# Patient Record
Sex: FEMALE | Race: WHITE | NOT HISPANIC OR LATINO | Employment: PART TIME | URBAN - METROPOLITAN AREA
[De-identification: names, ages, dates, MRNs, and addresses within clinical notes are randomized per-mention and may not be internally consistent; named-entity substitution may affect disease eponyms.]

---

## 2020-01-27 NOTE — PROGRESS NOTES
"Subjective     Diya Moreno is a 29 year old female who presents to clinic today for the following health issues:    History of Present Illness        Hypothyroidism:     Since last visit, patient describes the following symptoms::  Dry skin, Fatigue and Hair loss    Migraines:   Since the patient's last clinic visit, headaches are: no change  The patient is getting headaches:  Very often  She is not able to do normal daily activities when she has a migraine.  The patient is taking the following rescue/relief medications:  Ibuprofen (Advil, Motrin)   Patient states \"The relief is inconsistent\" from the rescue/relief medications.   The patient is taking the following medications to prevent migraines:  No medications to prevent migraines  In the past 4 weeks, the patient has gone to an Urgent Care or Emergency Room 0 times times due to headaches.    She eats 2-3 servings of fruits and vegetables daily.She consumes 0 sweetened beverage(s) daily.She exercises with enough effort to increase her heart rate 20 to 29 minutes per day.  She exercises with enough effort to increase her heart rate 3 or less days per week.   She is taking medications regularly.       Patient is here today to discuss possible thyroid problems. She was on thyroid medication about 10ish years ago and has never had it checked since. Last year, about this time, she went to [facility] and she was having foot problems and they think she had Raynaud's disease, her feet turn purple first and start tingling and then red and get really hot. She also wants to talk about migraines, she said she is on day 7.     Headache  Onset: 7 days, today is a little mild. Last Tuesday it was bad, Wednesday was up there. Thursday-Saturday was a little better. Monday she stayed home from work it felt like someone was squeezing her head.     Description:   Location: unilateral in the right frontal area, unilateral in the right temporal area   Character: squeezing pain " yesterday, feels like a lot of pressure today.   Frequency:  Constant, up and down for pain level.   Duration:  All day long.     Intensity: 2/10 right now.     Progression of Symptoms:  constant    Accompanying Signs & Symptoms:  Stiff neck: no  Neck or upper back pain: no  Fever: no, but she was having chills and sweats.  Sinus pressure: no  Nausea or vomiting: some nausea, last week on Tuesday.   Dizziness: YES-when it is really bad, worst   Numbness: YES-when it is really bad  Weakness: YES-when it is really bad, worst.  Visual changes: YES- blurry vision periodically. When she looks into a light, she sees spots.     History:   Head trauma: no  Family history of migraines: not that she is aware of  Previous tests for headaches: no, she was here once a few years ago for headaches.   Neurologist evaluations: no  Able to do daily activities: YES- for the most part, she does work on a computer so sometimes it is hard.   Wake with a headaches: YES- has been continual headache, yesterday morning for sure.   Do headaches wake you up: no  Daily pain medication use: not usually, ibuprofen though for the last week.   Work/school stressors/changes: no    Precipitating factors:   Does light make it worse: YES- sitting at computer and looking into the light.   Does sound make it worse: YES- when it is bad, she like to sit in a dark/quite room.     Alleviating factors:  Does sleep help: YES- most of the time, takes it down a couple of notches.     Therapies Tried and outcome: Ibuprofen (Advil, Motrin) and Imitrex. She has in the past tried tension headache medication many years ago.    - has had headaches maybe for the last 6 years.  However lately it has been very well controlled.  She had a single one day headache a few weeks ago but prior to that it had been at least a few months since her last one.   - Her headaches are never consistently in the same area.  Right now it feels like it is all over.    - Last week one day  and yesterday her headache was severe.  Otherwise the other days have been more tolerable but it is still present.   - No known family history of migraines.   - No vision changes, she saw ophthalmology a year ago and no prescription changes at that time.   - She has a history of hypothyroidism.  Diagnosed in 2012.  She was placed on thyroid medicine but never took the full bottle and never followed up.  Hasn't been on medications since then. She has no known family history of hypothyroidism or thyroid disease.   - She has occasional constipation.  Has issues with hair thinning and dry skin.  She denies anxiety/depression.  No palpitations.  Occasional dizziness with the headaches.     Patient Active Problem List   Diagnosis     CARDIOVASCULAR SCREENING; LDL GOAL LESS THAN 160     Bony exostosis     Intermittent asthma     Migraine headache without aura     History of hypothyroidism     Hypothyroidism     Past Surgical History:   Procedure Laterality Date     bunionectomy, left  8/2008    outside of [place]     EXCISE EXOSTOSIS FOOT  2/4/2014    Procedure: EXCISE EXOSTOSIS FOOT;  Exostectomy left first metatarsal;  Surgeon: Flako Colin DPM;  Location:  OR       Social History     Tobacco Use     Smoking status: Never Smoker     Smokeless tobacco: Never Used   Substance Use Topics     Alcohol use: Yes     Comment: occasionally      Family History   Problem Relation Age of Onset     Cerebrovascular Disease Mother      Heart Disease Mother         PFO repair     Cancer Maternal Grandmother         kidney     Diabetes Maternal Uncle      No Known Problems Father          Current Outpatient Medications   Medication Sig Dispense Refill     albuterol (PROAIR HFA, PROVENTIL HFA, VENTOLIN HFA) 108 (90 BASE) MCG/ACT inhaler Inhale 2 puffs into the lungs every 6 hours as needed for shortness of breath / dyspnea or wheezing 1 Inhaler 1     IBUPROFEN PO Take by mouth as needed for moderate pain       Allergies  "  Allergen Reactions     Benzoin      BP Readings from Last 3 Encounters:   01/28/20 106/80   12/29/14 128/72   03/20/14 110/60    Wt Readings from Last 3 Encounters:   01/28/20 68.5 kg (151 lb)   12/29/14 66.7 kg (147 lb)   03/20/14 64 kg (141 lb 1.6 oz)                    Reviewed and updated as needed this visit by Provider  Tobacco  Allergies  Meds  Problems  Med Hx  Surg Hx  Fam Hx         Review of Systems   ROS COMP: Constitutional, HEENT, cardiovascular, pulmonary, GI, , musculoskeletal, neuro, skin, endocrine and psych systems are negative, except as otherwise noted.      Objective    /80   Pulse 92   Temp 98.5  F (36.9  C) (Temporal)   Resp 16   Ht 1.7 m (5' 6.93\")   Wt 68.5 kg (151 lb)   LMP 01/22/2020 (Exact Date)   SpO2 97%   BMI 23.70 kg/m    Body mass index is 23.7 kg/m .  Physical Exam   GENERAL: healthy, alert and no distress  EYES: Eyes grossly normal to inspection, PERRL and conjunctivae and sclerae normal  HENT: ear canals and TM's normal, nose and mouth without ulcers or lesions  NECK: no adenopathy, no asymmetry, masses, or scars and thyroid right side lobe palpable without specific masses, left lobe normal.   RESP: lungs clear to auscultation - no rales, rhonchi or wheezes  CV: regular rate and rhythm, normal S1 S2, no S3 or S4, no murmur, click or rub, no peripheral edema and peripheral pulses strong  MS: no gross musculoskeletal defects noted, no edema, 5/5 upper extremity strength bilaterally.   SKIN: no suspicious lesions or rashes  NEURO: Normal strength and tone, sensory exam grossly normal, mentation intact, speech normal, cranial nerves 2-12 intact, DTR's normal and symmetric 2+ patellar, 1+ biceps, gait normal, Romberg normal and rapid alternating movements normal  PSYCH: mentation appears normal, affect normal/bright    Diagnostic Test Results:  Labs reviewed in Epic  Results for orders placed or performed in visit on 01/28/20 (from the past 24 hour(s))   TSH " with free T4 reflex   Result Value Ref Range    TSH 2.05 0.40 - 4.00 mU/L           Assessment & Plan       ICD-10-CM    1. Encounter to establish care Z76.89    2. Hypothyroidism, unspecified type E03.9 TSH with free T4 reflex     US Thyroid   3. Palpable thyroid E07.89 US Thyroid   4. Migraine without aura and without status migrainosus, not intractable G43.009       Updated Histories.     1. Thyroid:  - Rechecking TSH today.   - Her thyroid on right side was palpable without specific masses.   - Recommended ultrasound of the thyroid to evaluate.   - If TSH is elevated recommend starting medication even if TSH is higher end of normal given her symptoms.  Then recheck in 4-6 weeks for labs.   - Thyroid is within normal limits. Do not recommend replacement as it is 2, do recommend repeat labs annually.     2. Migraines:  - Right now it is mild.   - Her neurologic examination was benign.   - if thyroid is normal and she continues to have escalating migraines or persistent migraines would recommend MRI for evaluation. Then from there consider abortive versus prophylactic medication.   - Recommended headache diary in the meantime.     She is overdue for physical and pap smear (has never had one).  Will have her schedule follow-up regarding above issues in the next 2-4 weeks depending on labs and then update her physical at the same time.     Return in about 1 month (around 2/28/2020) for Physical Exam.    Options for treatment and follow-up care were reviewed with the patient and/or guardian. Patient and/or guardian engaged in the decision making process and verbalized understanding of the options discussed and agreed with the final plan.    Willard Arriola PA-C  [facility]

## 2020-01-28 ENCOUNTER — OFFICE VISIT (OUTPATIENT)
Dept: FAMILY MEDICINE | Facility: OTHER | Age: 30
End: 2020-01-28
Payer: COMMERCIAL

## 2020-01-28 VITALS
WEIGHT: 151 LBS | OXYGEN SATURATION: 97 % | SYSTOLIC BLOOD PRESSURE: 106 MMHG | BODY MASS INDEX: 23.7 KG/M2 | RESPIRATION RATE: 16 BRPM | HEIGHT: 67 IN | DIASTOLIC BLOOD PRESSURE: 80 MMHG | HEART RATE: 92 BPM | TEMPERATURE: 98.5 F

## 2020-01-28 DIAGNOSIS — E04.9 PALPABLE THYROID: ICD-10-CM

## 2020-01-28 DIAGNOSIS — G43.009 MIGRAINE WITHOUT AURA AND WITHOUT STATUS MIGRAINOSUS, NOT INTRACTABLE: ICD-10-CM

## 2020-01-28 DIAGNOSIS — E03.9 HYPOTHYROIDISM, UNSPECIFIED TYPE: ICD-10-CM

## 2020-01-28 DIAGNOSIS — Z76.89 ENCOUNTER TO ESTABLISH CARE: Primary | ICD-10-CM

## 2020-01-28 LAB — TSH SERPL DL<=0.005 MIU/L-ACNC: 2.05 MU/L (ref 0.4–4)

## 2020-01-28 PROCEDURE — 36415 COLL VENOUS BLD VENIPUNCTURE: CPT | Performed by: PHYSICIAN ASSISTANT

## 2020-01-28 PROCEDURE — 84443 ASSAY THYROID STIM HORMONE: CPT | Performed by: PHYSICIAN ASSISTANT

## 2020-01-28 PROCEDURE — 99204 OFFICE O/P NEW MOD 45 MIN: CPT | Performed by: PHYSICIAN ASSISTANT

## 2020-01-28 ASSESSMENT — ASTHMA QUESTIONNAIRES
ACT_TOTALSCORE: 25
QUESTION_2 LAST FOUR WEEKS HOW OFTEN HAVE YOU HAD SHORTNESS OF BREATH: NOT AT ALL
QUESTION_3 LAST FOUR WEEKS HOW OFTEN DID YOUR ASTHMA SYMPTOMS (WHEEZING, COUGHING, SHORTNESS OF BREATH, CHEST TIGHTNESS OR PAIN) WAKE YOU UP AT NIGHT OR EARLIER THAN USUAL IN THE MORNING: NOT AT ALL
QUESTION_1 LAST FOUR WEEKS HOW MUCH OF THE TIME DID YOUR ASTHMA KEEP YOU FROM GETTING AS MUCH DONE AT WORK, SCHOOL OR AT HOME: NONE OF THE TIME
QUESTION_4 LAST FOUR WEEKS HOW OFTEN HAVE YOU USED YOUR RESCUE INHALER OR NEBULIZER MEDICATION (SUCH AS ALBUTEROL): NOT AT ALL
QUESTION_5 LAST FOUR WEEKS HOW WOULD YOU RATE YOUR ASTHMA CONTROL: COMPLETELY CONTROLLED

## 2020-01-28 ASSESSMENT — MIFFLIN-ST. JEOR: SCORE: 1446.43

## 2020-01-28 NOTE — PATIENT INSTRUCTIONS
We will call with lab and ultrasound results and plan for follow-up.     Let me know in the meantime or come back in if the headaches are becoming more severe or new symptoms develop with them.  In the meantime take a headache diary.

## 2020-01-29 ENCOUNTER — ANCILLARY PROCEDURE (OUTPATIENT)
Dept: ULTRASOUND IMAGING | Facility: CLINIC | Age: 30
End: 2020-01-29
Attending: PHYSICIAN ASSISTANT
Payer: COMMERCIAL

## 2020-01-29 DIAGNOSIS — E03.9 HYPOTHYROIDISM, UNSPECIFIED TYPE: ICD-10-CM

## 2020-01-29 DIAGNOSIS — E04.9 PALPABLE THYROID: ICD-10-CM

## 2020-01-29 PROCEDURE — 76536 US EXAM OF HEAD AND NECK: CPT

## 2020-01-29 ASSESSMENT — ASTHMA QUESTIONNAIRES: ACT_TOTALSCORE: 25

## 2020-01-31 ENCOUNTER — TELEPHONE (OUTPATIENT)
Dept: FAMILY MEDICINE | Facility: OTHER | Age: 30
End: 2020-01-31

## 2020-01-31 DIAGNOSIS — E06.9 THYROIDITIS: Primary | ICD-10-CM

## 2020-01-31 NOTE — TELEPHONE ENCOUNTER
Spoke with patient.     In 2012 she has Thyroperoxidase Antibody drawn and was elevated. This is not surprising as she had hypothyroidism in 2012.  She didn't have any signs of hyperthyroidism at the time and her T3 and T4 were repeated as well as TSH in 2012 and they were within normal limits.   She will likely need screens.  Possibly autoimmune thyroiditis.  No signs of masses to suggest cancer at this time.     Recommended obtaining T3 and T4 and if WNL can continue to screen annually or any concerns or changes.     She will follow-up to discuss migraine/headache management if they continue.     Willard Arriola PA-C

## 2020-02-03 DIAGNOSIS — E06.9 THYROIDITIS: ICD-10-CM

## 2020-02-03 PROCEDURE — 36415 COLL VENOUS BLD VENIPUNCTURE: CPT | Performed by: PHYSICIAN ASSISTANT

## 2020-02-03 PROCEDURE — 84480 ASSAY TRIIODOTHYRONINE (T3): CPT | Performed by: PHYSICIAN ASSISTANT

## 2020-02-03 PROCEDURE — 84481 FREE ASSAY (FT-3): CPT | Performed by: PHYSICIAN ASSISTANT

## 2020-02-03 PROCEDURE — 84439 ASSAY OF FREE THYROXINE: CPT | Performed by: PHYSICIAN ASSISTANT

## 2020-02-04 LAB
T3 SERPL-MCNC: 132 NG/DL (ref 60–181)
T3FREE SERPL-MCNC: 3.1 PG/ML (ref 2.3–4.2)
T4 FREE SERPL-MCNC: 0.92 NG/DL (ref 0.76–1.46)

## 2020-02-11 ENCOUNTER — TELEPHONE (OUTPATIENT)
Dept: FAMILY MEDICINE | Facility: OTHER | Age: 30
End: 2020-02-11

## 2020-02-11 NOTE — PATIENT INSTRUCTIONS
Migraine/Headache:  - Start Nortriptyline  - Take 1 tablet at bedtime.   - Monitor for side effects.   - If tolerating, after a week can increase to 2 tablets if you haven't see any benefits in sleep or headaches.     Hemorrhoid:  - Control the constipation (Docusate sodium 100 mg daily or Miralax 17 grams daily.)  - Probiotics  - Over the counter hemorrhoid cream  - Sitz baths.       Preventive Health Recommendations  Female Ages 26 - 39  Yearly exam:   See your health care provider every year in order to    Review health changes.     Discuss preventive care.      Review your medicines if you your doctor has prescribed any.    Until age 30: Get a Pap test every three years (more often if you have had an abnormal result).    After age 30: Talk to your doctor about whether you should have a Pap test every 3 years or have a Pap test with HPV screening every 5 years.   You do not need a Pap test if your uterus was removed (hysterectomy) and you have not had cancer.  You should be tested each year for STDs (sexually transmitted diseases), if you're at risk.   Talk to your provider about how often to have your cholesterol checked.  If you are at risk for diabetes, you should have a diabetes test (fasting glucose).  Shots: Get a flu shot each year. Get a tetanus shot every 10 years.   Nutrition:     Eat at least 5 servings of fruits and vegetables each day.    Eat whole-grain bread, whole-wheat pasta and brown rice instead of white grains and rice.    Get adequate Calcium and Vitamin D.     Lifestyle    Exercise at least 150 minutes a week (30 minutes a day, 5 days of the week). This will help you control your weight and prevent disease.    Limit alcohol to one drink per day.    No smoking.     Wear sunscreen to prevent skin cancer.    See your dentist every six months for an exam and cleaning.

## 2020-02-14 ENCOUNTER — OFFICE VISIT (OUTPATIENT)
Dept: FAMILY MEDICINE | Facility: OTHER | Age: 30
End: 2020-02-14
Payer: COMMERCIAL

## 2020-02-14 VITALS
HEART RATE: 100 BPM | BODY MASS INDEX: 23.86 KG/M2 | HEIGHT: 67 IN | DIASTOLIC BLOOD PRESSURE: 80 MMHG | OXYGEN SATURATION: 99 % | TEMPERATURE: 98.6 F | SYSTOLIC BLOOD PRESSURE: 110 MMHG | WEIGHT: 152 LBS | RESPIRATION RATE: 14 BRPM

## 2020-02-14 DIAGNOSIS — Z00.00 ROUTINE GENERAL MEDICAL EXAMINATION AT A HEALTH CARE FACILITY: Primary | ICD-10-CM

## 2020-02-14 DIAGNOSIS — G43.009 MIGRAINE WITHOUT AURA AND WITHOUT STATUS MIGRAINOSUS, NOT INTRACTABLE: ICD-10-CM

## 2020-02-14 DIAGNOSIS — Z23 NEED FOR DIPHTHERIA-TETANUS-PERTUSSIS (TDAP) VACCINE: ICD-10-CM

## 2020-02-14 DIAGNOSIS — Z12.4 SCREENING FOR MALIGNANT NEOPLASM OF CERVIX: ICD-10-CM

## 2020-02-14 DIAGNOSIS — J45.20 MILD INTERMITTENT ASTHMA WITHOUT COMPLICATION: ICD-10-CM

## 2020-02-14 PROCEDURE — G0145 SCR C/V CYTO,THINLAYER,RESCR: HCPCS | Performed by: PHYSICIAN ASSISTANT

## 2020-02-14 PROCEDURE — 90471 IMMUNIZATION ADMIN: CPT | Performed by: PHYSICIAN ASSISTANT

## 2020-02-14 PROCEDURE — 90715 TDAP VACCINE 7 YRS/> IM: CPT | Performed by: PHYSICIAN ASSISTANT

## 2020-02-14 PROCEDURE — 99395 PREV VISIT EST AGE 18-39: CPT | Mod: 25 | Performed by: PHYSICIAN ASSISTANT

## 2020-02-14 PROCEDURE — 99213 OFFICE O/P EST LOW 20 MIN: CPT | Mod: 25 | Performed by: PHYSICIAN ASSISTANT

## 2020-02-14 RX ORDER — NORTRIPTYLINE HCL 10 MG
10 CAPSULE ORAL AT BEDTIME
Qty: 90 CAPSULE | Refills: 1 | Status: SHIPPED | OUTPATIENT
Start: 2020-02-14 | End: 2021-03-01

## 2020-02-14 ASSESSMENT — ENCOUNTER SYMPTOMS
BACK PAIN: 0
HEADACHES: 1
COUGH: 0
CHILLS: 0
HEMATURIA: 0
BREAST MASS: 0
DYSURIA: 0
SHORTNESS OF BREATH: 0
NERVOUS/ANXIOUS: 0
FREQUENCY: 0
CONSTIPATION: 0
DIARRHEA: 0
ARTHRALGIAS: 0
FEVER: 0
EYE PAIN: 0
DIZZINESS: 0
HEMATOCHEZIA: 0
ABDOMINAL PAIN: 0

## 2020-02-14 ASSESSMENT — MIFFLIN-ST. JEOR: SCORE: 1447.84

## 2020-02-14 NOTE — LETTER
My Asthma Action Plan    Name: Diya Lombardo   YOB: 1990  Date: 2/14/2020   My doctor: Willard Arriola PA-C   My clinic: [facility]        My Rescue Medicine:   Albuterol inhaler (Proair/Ventolin/Proventil HFA)  2-4 puffs EVERY 4 HOURS as needed. Use a spacer if recommended by your provider.   My Asthma Severity:   Intermittent / Exercise Induced  Know your asthma triggers: exercise or sports             GREEN ZONE   Good Control    I feel good    No cough or wheeze    Can work, sleep and play without asthma symptoms       Take your asthma control medicine every day.     1. If exercise triggers your asthma, take your rescue medication    15 minutes before exercise or sports, and    During exercise if you have asthma symptoms  2. Spacer to use with inhaler: If you have a spacer, make sure to use it with your inhaler             YELLOW ZONE Getting Worse  I have ANY of these:    I do not feel good    Cough or wheeze    Chest feels tight    Wake up at night   1. Keep taking your Green Zone medications  2. Start taking your rescue medicine:    every 20 minutes for up to 1 hour. Then every 4 hours for 24-48 hours.  3. If you stay in the Yellow Zone for more than 12-24 hours, contact your doctor.  4. If you do not return to the Green Zone in 12-24 hours or you get worse, start taking your oral steroid medicine if prescribed by your provider.           RED ZONE Medical Alert - Get Help  I have ANY of these:    I feel awful    Medicine is not helping    Breathing getting harder    Trouble walking or talking    Nose opens wide to breathe       1. Take your rescue medicine NOW  2. If your provider has prescribed an oral steroid medicine, start taking it NOW  3. Call your doctor NOW  4. If you are still in the Red Zone after 20 minutes and you have not reached your doctor:    Take your rescue medicine again and    Call 911 or go to the emergency room right away    See your regular doctor within  2 weeks of an Emergency Room or Urgent Care visit for follow-up treatment.          Annual Reminders:  Meet with Asthma Educator,  Flu Shot in the Fall, consider Pneumonia Vaccination for patients with asthma (aged 19 and older).    Pharmacy: [facility] [address] [place] [id number] [facility]    Electronically signed by Willard Arriola PA-C   Date: 02/14/20                    Asthma Triggers  How To Control Things That Make Your Asthma Worse    Triggers are things that make your asthma worse.  Look at the list below to help you find your triggers and   what you can do about them. You can help prevent asthma flare-ups by staying away from your triggers.      Trigger                                                          What you can do   Cigarette Smoke  Tobacco smoke can make asthma worse. Do not allow smoking in your home, car or around you.  Be sure no one smokes at a child s day care or school.  If you smoke, ask your health care provider for ways to help you quit.  Ask family members to quit too.  Ask your health care provider for a referral to Quit Plan to help you quit smoking, or call 4-097-917-PLAN.     Colds, Flu, Bronchitis  These are common triggers of asthma. Wash your hands often.  Don t touch your eyes, nose or mouth.  Get a flu shot every year.     Dust Mites  These are tiny bugs that live in cloth or carpet. They are too small to see. Wash sheets and blankets in hot water every week.   Encase pillows and mattress in dust mite proof covers.  Avoid having carpet if you can. If you have carpet, vacuum weekly.   Use a dust mask and HEPA vacuum.   Pollen and Outdoor Mold  Some people are allergic to trees, grass, or weed pollen, or molds. Try to keep your windows closed.  Limit time out doors when pollen count is high.   Ask you health care provider about taking medicine during allergy season.     Animal Dander  Some people are allergic to skin flakes, urine or saliva from pets with fur or  feathers. Keep pets with fur or feathers out of your home.    If you can t keep the pet outdoors, then keep the pet out of your bedroom.  Keep the bedroom door closed.  Keep pets off cloth furniture and away from stuffed toys.     Mice, Rats, and Cockroaches  Some people are allergic to the waste from these pests.   Cover food and garbage.  Clean up spills and food crumbs.  Store grease in the refrigerator.   Keep food out of the bedroom.   Indoor Mold  This can be a trigger if your home has high moisture. Fix leaking faucets, pipes, or other sources of water.   Clean moldy surfaces.  Dehumidify basement if it is damp and smelly.   Smoke, Strong Odors, and Sprays  These can reduce air quality. Stay away from strong odors and sprays, such as perfume, powder, hair spray, paints, smoke incense, paint, cleaning products, candles and new carpet.   Exercise or Sports  Some people with asthma have this trigger. Be active!  Ask your doctor about taking medicine before sports or exercise to prevent symptoms.    Warm up for 5-10 minutes before and after sports or exercise.     Other Triggers of Asthma  Cold air:  Cover your nose and mouth with a scarf.  Sometimes laughing or crying can be a trigger.  Some medicines and food can trigger asthma.

## 2020-02-14 NOTE — NURSING NOTE
Prior to immunization administration, verified patients identity using patient s name and date of birth. Please see Immunization Activity for additional information.     Screening Questionnaire for Adult Immunization    Are you sick today?   No   Do you have allergies to medications, food, a vaccine component or latex?   No   Have you ever had a serious reaction after receiving a vaccination?   No   Do you have a long-term health problem with heart, lung, kidney, or metabolic disease (e.g., diabetes), asthma, a blood disorder, no spleen, complement component deficiency, a cochlear implant, or a spinal fluid leak?  Are you on long-term aspirin therapy?   Yes   Do you have cancer, leukemia, HIV/AIDS, or any other immune system problem?   No   Do you have a parent, brother, or sister with an immune system problem?   No   In the past 3 months, have you taken medications that affect  your immune system, such as prednisone, other steroids, or anticancer drugs; drugs for the treatment of rheumatoid arthritis, Crohn s disease, or psoriasis; or have you had radiation treatments?   No   Have you had a seizure, or a brain or other nervous system problem?   No   During the past year, have you received a transfusion of blood or blood    products, or been given immune (gamma) globulin or antiviral drug?   No   For women: Are you pregnant or is there a chance you could become       pregnant during the next month?   No   Have you received any vaccinations in the past 4 weeks?   No     Immunization questionnaire was positive for at least one answer.  Notified ES.        Per orders of ES, injection of Tdap given by Kierra Terrell MA. Patient instructed to remain in clinic for 15 minutes afterwards, and to report any adverse reaction to me immediately.       Screening performed by Kierra Terrell MA on 2/14/2020 at 8:45 AM.

## 2020-02-14 NOTE — PROGRESS NOTES
SUBJECTIVE:   CC: Diya Lombardo is an 29 year old woman who presents for preventive health visit.     Healthy Habits:     Getting at least 3 servings of Calcium per day:  Yes    Bi-annual eye exam:  Yes    Dental care twice a year:  NO    Sleep apnea or symptoms of sleep apnea:  Daytime drowsiness    Diet:  Regular (no restrictions)    Frequency of exercise:  2-3 days/week    Duration of exercise:  15-30 minutes    Taking medications regularly:  Yes    Medication side effects:  None    PHQ-2 Total Score: 0    Additional concerns today:  No    Headache Recheck   Onset: a month     Description:   Location: unilateral in the right frontal area, unilateral in the right temporal area. The last couple of days have been back of head near her neck.    Character: light throbbing pain.   Frequency:  Everyday but maybe 2-3 days.   Duration:  Most of the day, but doesn't feel like it has completely gone away.     Intensity: mild, have gotten better since last visit.     Progression of Symptoms:  improving    Accompanying Signs & Symptoms:  Stiff neck: no  Neck or upper back pain: YES- and shoulder areas.   Fever: no  Sinus pressure: no  Nausea or vomiting: no  Dizziness: no  Numbness: no  Weakness: no  Visual changes: no    History:   Head trauma: no  Family history of migraines: no  Previous tests for headaches: YES- Ultrasound but that was of thyroid   Neurologist evaluations: no  Able to do daily activities: YES, sometimes but looking at computer and being a lit room is annoying.   Wake with a headaches: YES  Do headaches wake you up: no  Daily pain medication use: YES- ibuprofen   Work/school stressors/changes: no    Precipitating factors:   Does light make it worse: YES  Does sound make it worse: YES- when its bad.     Alleviating factors:  Does sleep help: YES    Therapies Tried and outcome: Ibuprofen (Advil, Motrin)    - Has only had one severe headache since she was last seen.   - Has had fatigue.  Notes she moves  around a lot at night.        Today's PHQ-2 Score:   PHQ-2 ( 1999 Pfizer) 2/14/2020   Q1: Little interest or pleasure in doing things 0   Q2: Feeling down, depressed or hopeless 0   PHQ-2 Score 0   Q1: Little interest or pleasure in doing things Not at all   Q2: Feeling down, depressed or hopeless Not at all   PHQ-2 Score 0     Abuse: Current or Past(Physical, Sexual or Emotional)- No  Do you feel safe in your environment? Yes    Social History     Tobacco Use     Smoking status: Never Smoker     Smokeless tobacco: Never Used   Substance Use Topics     Alcohol use: Yes     Comment: occasionally      Alcohol Use 2/14/2020   Prescreen: >3 drinks/day or >7 drinks/week? No     Reviewed orders with patient.  Reviewed health maintenance and updated orders accordingly - Yes  BP Readings from Last 3 Encounters:   02/14/20 110/80   01/28/20 106/80   12/29/14 128/72    Wt Readings from Last 3 Encounters:   02/14/20 68.9 kg (152 lb)   01/28/20 68.5 kg (151 lb)   12/29/14 66.7 kg (147 lb)                  Patient Active Problem List   Diagnosis     CARDIOVASCULAR SCREENING; LDL GOAL LESS THAN 160     Bony exostosis     Intermittent asthma     Migraine headache without aura     History of hypothyroidism     Hypothyroidism     Past Surgical History:   Procedure Laterality Date     bunionectomy, left  8/2008    outside of [place]     EXCISE EXOSTOSIS FOOT  2/4/2014    Procedure: EXCISE EXOSTOSIS FOOT;  Exostectomy left first metatarsal;  Surgeon: Flako Colin DPM;  Location:  OR       Social History     Tobacco Use     Smoking status: Never Smoker     Smokeless tobacco: Never Used   Substance Use Topics     Alcohol use: Yes     Comment: occasionally      Family History   Problem Relation Age of Onset     Cerebrovascular Disease Mother      Heart Disease Mother         PFO repair     Cancer Maternal Grandmother         kidney     Diabetes Maternal Uncle      No Known Problems Father          Current Outpatient  "Medications   Medication Sig Dispense Refill     albuterol (PROAIR HFA, PROVENTIL HFA, VENTOLIN HFA) 108 (90 BASE) MCG/ACT inhaler Inhale 2 puffs into the lungs every 6 hours as needed for shortness of breath / dyspnea or wheezing 1 Inhaler 1     IBUPROFEN PO Take by mouth as needed for moderate pain       nortriptyline (PAMELOR) 10 MG capsule Take 1 capsule (10 mg) by mouth At Bedtime 90 capsule 1       Mammogram not appropriate for this patient based on age.    Pertinent mammograms are reviewed under the imaging tab.  History of abnormal Pap smear: NO - age 21-29 PAP every 3 years recommended     Reviewed and updated as needed this visit by clinical staff  Tobacco  Allergies  Meds  Med Hx  Surg Hx  Fam Hx  Soc Hx        Reviewed and updated as needed this visit by Provider          Review of Systems   Constitutional: Negative for chills and fever.   HENT: Negative for congestion and ear pain.    Eyes: Negative for pain.   Respiratory: Negative for cough and shortness of breath.    Cardiovascular: Negative for chest pain.   Gastrointestinal: Negative for abdominal pain, constipation, diarrhea and hematochezia.   Breasts:  Negative for tenderness, breast mass and discharge.   Genitourinary: Negative for dysuria, frequency, hematuria, pelvic pain and vaginal bleeding.   Musculoskeletal: Negative for arthralgias and back pain.   Skin: Negative for rash.   Neurological: Positive for headaches. Negative for dizziness.   Psychiatric/Behavioral: The patient is not nervous/anxious.         OBJECTIVE:   /80   Pulse 100   Temp 98.6  F (37  C) (Temporal)   Resp 14   Ht 1.695 m (5' 6.73\")   Wt 68.9 kg (152 lb)   LMP 01/22/2020 (Exact Date)   SpO2 99%   BMI 24.00 kg/m    Physical Exam  GENERAL: healthy, alert and no distress  EYES: Eyes grossly normal to inspection, PERRL and conjunctivae and sclerae normal  HENT: ear canals and TM's normal, nose and mouth without ulcers or lesions  NECK: no adenopathy, no " asymmetry, masses, or scars and thyroid normal to palpation  RESP: lungs clear to auscultation - no rales, rhonchi or wheezes  BREAST: normal without masses, tenderness or nipple discharge and no palpable axillary masses or adenopathy  CV: regular rate and rhythm, normal S1 S2, no S3 or S4, no murmur, click or rub, no peripheral edema and peripheral pulses strong  ABDOMEN: soft, nontender, no hepatosplenomegaly, no masses and bowel sounds normal   (female): normal female external genitalia, normal urethral meatus, vaginal mucosa pink, moist, well rugated, and normal cervix/adnexa/uterus without masses or discharge  MS: no gross musculoskeletal defects noted, no edema  SKIN: no suspicious lesions or rashes  NEURO: Normal strength and tone, mentation intact and speech normal  PSYCH: mentation appears normal, affect normal/bright    Diagnostic Test Results:  Labs reviewed in Epic    ASSESSMENT/PLAN:       ICD-10-CM    1. Routine general medical examination at a health care facility Z00.00    2. Screening for malignant neoplasm of cervix Z12.4 Pap imaged thin layer screen reflex to HPV if ASCUS - recommend age 25 - 29   3. Migraine without aura and without status migrainosus, not intractable G43.009 nortriptyline (PAMELOR) 10 MG capsule   4. Mild intermittent asthma without complication J45.20    5. Need for diphtheria-tetanus-pertussis (Tdap) vaccine Z23 TDAP VACCINE (ADACEL)          ADMIN VACCINE, FIRST     Migraines:  - She is having maybe one per week at this point we discussed abortive versus preventative treatment.  Because she may be not sleeping well which is contributing to her headaches (doesn't fit the classic ideal of ASUNCION) will try Nortriptyline to see if this helps with sleep but also her headaches.  Discussed potential side effects of the medication.  Start 10 mg daily, increase to 20 mg after a week.     Asthma:  - Well controlled, AAP and ACT updated.     COUNSELING:  Reviewed preventive health  "counseling, as reflected in patient instructions  Special attention given to:        Regular exercise       Healthy diet/nutrition    Estimated body mass index is 24 kg/m  as calculated from the following:    Height as of this encounter: 1.695 m (5' 6.73\").    Weight as of this encounter: 68.9 kg (152 lb).         reports that she has never smoked. She has never used smokeless tobacco.      Counseling Resources:  ATP IV Guidelines  Pooled Cohorts Equation Calculator  Breast Cancer Risk Calculator  FRAX Risk Assessment  ICSI Preventive Guidelines  Dietary Guidelines for Americans, 2010  USDA's MyPlate  ASA Prophylaxis  Lung CA Screening    Willard Arriola PA-C  [facility]  "

## 2020-02-15 ASSESSMENT — ASTHMA QUESTIONNAIRES: ACT_TOTALSCORE: 25

## 2020-02-18 LAB
COPATH REPORT: NORMAL
PAP: NORMAL

## 2020-02-20 ENCOUNTER — OFFICE VISIT (OUTPATIENT)
Dept: FAMILY MEDICINE | Facility: OTHER | Age: 30
End: 2020-02-20
Payer: COMMERCIAL

## 2020-02-20 VITALS
BODY MASS INDEX: 24.17 KG/M2 | TEMPERATURE: 98.5 F | WEIGHT: 154 LBS | OXYGEN SATURATION: 99 % | RESPIRATION RATE: 16 BRPM | SYSTOLIC BLOOD PRESSURE: 108 MMHG | HEART RATE: 102 BPM | DIASTOLIC BLOOD PRESSURE: 80 MMHG | HEIGHT: 67 IN

## 2020-02-20 DIAGNOSIS — J10.1 INFLUENZA B: Primary | ICD-10-CM

## 2020-02-20 LAB
DEPRECATED S PYO AG THROAT QL EIA: NEGATIVE
FLUAV+FLUBV AG SPEC QL: NEGATIVE
FLUAV+FLUBV AG SPEC QL: POSITIVE
SPECIMEN SOURCE: ABNORMAL
SPECIMEN SOURCE: NORMAL
SPECIMEN SOURCE: NORMAL
STREP GROUP A PCR: NOT DETECTED

## 2020-02-20 PROCEDURE — 87804 INFLUENZA ASSAY W/OPTIC: CPT | Performed by: PHYSICIAN ASSISTANT

## 2020-02-20 PROCEDURE — 40001204 ZZHCL STATISTIC STREP A RAPID: Performed by: PHYSICIAN ASSISTANT

## 2020-02-20 PROCEDURE — 87651 STREP A DNA AMP PROBE: CPT | Performed by: PHYSICIAN ASSISTANT

## 2020-02-20 PROCEDURE — 99213 OFFICE O/P EST LOW 20 MIN: CPT | Performed by: PHYSICIAN ASSISTANT

## 2020-02-20 RX ORDER — OSELTAMIVIR PHOSPHATE 75 MG/1
75 CAPSULE ORAL 2 TIMES DAILY
Qty: 10 CAPSULE | Refills: 0 | Status: SHIPPED | OUTPATIENT
Start: 2020-02-20 | End: 2020-02-25

## 2020-02-20 ASSESSMENT — MIFFLIN-ST. JEOR: SCORE: 1457.2

## 2020-02-20 NOTE — PATIENT INSTRUCTIONS
You have influenza B.  Will prescribe Tamiflu to take twice daily for 5 days to speed up improvement of symptoms.  I recommend plenty of fluids along with breathing in warm, moist air/humidifer.   Can use over the counter Mucinex to help with congestion. Flonase nasal spray can also be helpful.  Use ibuprofen and/or Tylenol to help with fevers/pain.  Honey and tea can help with a cough if this develops.  If symptoms are not improving within the next week, you should be seen again.

## 2020-02-20 NOTE — PROGRESS NOTES
"Subjective     Diya Lombardo is a 29 year old female who presents to clinic today for the following health issues:    HPI     Acute Illness   Acute illness concerns: sore throat   Onset: 1 day     Fever: no    Chills/Sweats: YES- sweats     Headache (location?): YES    Sinus Pressure:YES- post-nasal drainage and jaw pain/swollen lymph nodes     Conjunctivitis:  no    Ear Pain: no    Rhinorrhea: YES    Congestion: no    Sore Throat: YES     Cough: no    Wheeze: no    Decreased Appetite: no    Nausea: no    Vomiting: no    Diarrhea:  no    Dysuria/Freq.: no    Fatigue/Achiness: YES- achiness     Sick/Strep Exposure: no     Therapies Tried and outcome: dayquil    Sore throat started last night and woke up today with body aches and sweats along with tenderness over the left anterior neck lymph nodes. No cough.    Reviewed and updated as needed this visit by Provider  Allergies  Meds  Problems     Review of Systems   ROS COMP: Constitutional, HEENT, cardiovascular, pulmonary, gi and gu systems are negative, except as otherwise noted.      Objective    /80   Pulse 102   Temp 98.5  F (36.9  C) (Temporal)   Resp 16   Ht 1.695 m (5' 6.75\")   Wt 69.9 kg (154 lb)   LMP 01/22/2020 (Exact Date)   SpO2 99%   BMI 24.30 kg/m    Body mass index is 24.3 kg/m .  Physical Exam   GENERAL: healthy, alert and no distress  EYES: Eyes grossly normal to inspection, PERRL and conjunctivae and sclerae normal  HENT: ear canals and TM's normal, nose and mouth without ulcers or lesions  NECK: Mildly tender bilateral anterior cervical chain adenopathy, L > R  RESP: lungs clear to auscultation - no rales, rhonchi or wheezes  CV: regular rate and rhythm, normal S1 S2, no S3 or S4, no murmur, click or rub    Results for orders placed or performed in visit on 02/20/20   Streptococcus A Rapid Scr w Reflx to PCR     Status: None   Result Value Ref Range    Strep Specimen Description Throat     Streptococcus Group A Rapid Screen Negative " NEG^Negative   Influenza A/B antigen     Status: Abnormal   Result Value Ref Range    Influenza A/B Agn Specimen Nasal     Influenza A Negative NEG^Negative    Influenza B Positive (A) NEG^Negative       Assessment & Plan       ICD-10-CM    1. Influenza B J10.1 Streptococcus A Rapid Scr w Reflx to PCR     Influenza A/B antigen     Group A Streptococcus PCR Throat Swab     oseltamivir 75 MG PO capsule        Positive influenza B.  Caught symptoms early and she has a wedding this weekend so will prescribe Tamiflu to take twice daily for 5 days as directed.  I recommend plenty of fluids along with breathing in warm, moist air/humidifer.   Can use over the counter Mucinex to help with congestion. Flonase nasal spray can also be helpful.  Use ibuprofen and/or Tylenol to help with fevers/pain.  Honey and tea can help with a cough if this develops.  If symptoms are not improving within the next week or if symptoms worsen, she should be seen again.     Mike Yoon PA-C  [facility]

## 2020-03-01 ENCOUNTER — HEALTH MAINTENANCE LETTER (OUTPATIENT)
Age: 30
End: 2020-03-01

## 2020-12-14 ENCOUNTER — HEALTH MAINTENANCE LETTER (OUTPATIENT)
Age: 30
End: 2020-12-14

## 2021-03-01 ENCOUNTER — ANCILLARY PROCEDURE (OUTPATIENT)
Dept: ULTRASOUND IMAGING | Facility: OTHER | Age: 31
End: 2021-03-01
Attending: OBSTETRICS & GYNECOLOGY
Payer: COMMERCIAL

## 2021-03-01 ENCOUNTER — OFFICE VISIT (OUTPATIENT)
Dept: OBGYN | Facility: OTHER | Age: 31
End: 2021-03-01
Payer: COMMERCIAL

## 2021-03-01 VITALS
HEART RATE: 68 BPM | HEIGHT: 67 IN | WEIGHT: 165 LBS | DIASTOLIC BLOOD PRESSURE: 74 MMHG | SYSTOLIC BLOOD PRESSURE: 118 MMHG | BODY MASS INDEX: 25.9 KG/M2 | OXYGEN SATURATION: 100 %

## 2021-03-01 DIAGNOSIS — Z31.41 FERTILITY TESTING: Primary | ICD-10-CM

## 2021-03-01 DIAGNOSIS — Z31.41 FERTILITY TESTING: ICD-10-CM

## 2021-03-01 LAB
PROGEST SERPL-MCNC: 9.8 NG/ML
PROLACTIN SERPL-MCNC: 26 UG/L (ref 3–27)
TSH SERPL DL<=0.005 MIU/L-ACNC: 1.8 MU/L (ref 0.4–4)

## 2021-03-01 PROCEDURE — 99204 OFFICE O/P NEW MOD 45 MIN: CPT | Performed by: OBSTETRICS & GYNECOLOGY

## 2021-03-01 PROCEDURE — 84144 ASSAY OF PROGESTERONE: CPT | Performed by: OBSTETRICS & GYNECOLOGY

## 2021-03-01 PROCEDURE — 84443 ASSAY THYROID STIM HORMONE: CPT | Performed by: OBSTETRICS & GYNECOLOGY

## 2021-03-01 PROCEDURE — 84146 ASSAY OF PROLACTIN: CPT | Performed by: OBSTETRICS & GYNECOLOGY

## 2021-03-01 PROCEDURE — 36415 COLL VENOUS BLD VENIPUNCTURE: CPT | Performed by: OBSTETRICS & GYNECOLOGY

## 2021-03-01 ASSESSMENT — MIFFLIN-ST. JEOR: SCORE: 1510.03

## 2021-03-01 NOTE — PROGRESS NOTES
OB/GYN New Consult      NAME:  Diya Lombardo  PCP:  Willard Arriola  MRN:  [id number]    Reason for Consult:  fertility  Self referred    Impression / Plan     30 year old  with:      ICD-10-CM    1. Fertility testing  Z31.41 Progesterone     TSH with free T4 reflex     Prolactin     US Pelvic Complete with Transvaginal       Discussed possible etiologies for fertility concerns.  Clinically, the patient appears to be ovulating and is at low risk for infertility.  We will start with labs and ultrasound.  Also plan day 3 FSH and estradiol levels.  These will be ordered after the results come in.  If everything appears normal, plan semen analysis.  Then consider Clomid.  If not pregnant after 3 cycles of Clomid, consider hysterosalpingogram.  Patient in agreement with plan and has no additional questions.    Chief Complaint     Chief Complaint   Patient presents with     Fertility     Consult       HPI     Diya Lombardo is a  30 year old female who is seen for fertility concerns.  Has been trying for over a year.     Patient's last menstrual period was 2021.   She is day 24 today.    Periods are usually 4 days and very normal aside from two months.  In 2020 and 2021 -  Periods very painful, but bleeding was lighter.  Pain in the lower abdomen, nausea.  Pain last for 1-1.5 days.  Clots with those periods, but generally no clots.    Normally the first day is heavier, then very light by day 3.  Tampons every couple of hours when it is heavy.  Feels pretty normal.      Female factor:    General health: hypothyroidism has resolved, no meds.  Intermittent asthma, mostly exercise induced or when it is humid.   Prior pregnancies:  No  Previous infertility work up:  No  Most recent form of birth control:  Depo provera 9 years ago.    History of STI:  No  Ovulation predictor kits: No, but follows temperature charting.   Seems to be ovulating most months.    Timed intercourse: Yes  Tubal study done?:   No    Occupation of Patient: desk  Chemical or toxin exposure at home or work: No    Male factor:   General health:  Ismael Lombardo. Healthy  Father of prior pregnancies:  No  Semen Analysis: No      Occupation of Partner: Manager at [employer]   Chemical or toxin exposure at home or work: No      Date of Last Pap Smear:   Lab Results   Component Value Date    PAP NIL 02/14/2020       Problem List     Patient Active Problem List    Diagnosis Date Noted     Migraine headache without aura 12/21/2014     Priority: Medium     History of hypothyroidism 12/21/2014     Priority: Medium     Intermittent asthma 12/19/2014     Priority: Medium     Bony exostosis 02/06/2014     Priority: Medium     Problem list name updated by automated process. Provider to review       CARDIOVASCULAR SCREENING; LDL GOAL LESS THAN 160 08/07/2011     Priority: Medium       Medications     Current Outpatient Medications   Medication     albuterol (PROAIR HFA, PROVENTIL HFA, VENTOLIN HFA) 108 (90 BASE) MCG/ACT inhaler     IBUPROFEN PO     No current facility-administered medications for this visit.         Allergies     Allergies   Allergen Reactions     Benzoin        Past Medical/Surgical History     Past Medical History:   Diagnosis Date     Thyroid disease        Past Surgical History:   Procedure Laterality Date     bunionectomy, left  8/2008    outside of [place]     EXCISE EXOSTOSIS FOOT  2/4/2014    Procedure: EXCISE EXOSTOSIS FOOT;  Exostectomy left first metatarsal;  Surgeon: Flako Colin DPM;  Location:  OR        Social History     Social History     Socioeconomic History     Marital status:      Spouse name: Not on file     Number of children: Not on file     Years of education: Not on file     Highest education level: Not on file   Occupational History     Not on file   Social Needs     Financial resource strain: Not on file     Food insecurity     Worry: Not on file     Inability: Not on file     Transportation  "needs     Medical: Not on file     Non-medical: Not on file   Tobacco Use     Smoking status: Never Smoker     Smokeless tobacco: Never Used   Substance and Sexual Activity     Alcohol use: Yes     Comment: occasionally      Drug use: No     Sexual activity: Yes     Partners: Male     Birth control/protection: Condom   Lifestyle     Physical activity     Days per week: Not on file     Minutes per session: Not on file     Stress: Not on file   Relationships     Social connections     Talks on phone: Not on file     Gets together: Not on file     Attends [employer] service: Not on file     Active member of club or organization: Not on file     Attends meetings of clubs or organizations: Not on file     Relationship status: Not on file     Intimate partner violence     Fear of current or ex partner: Not on file     Emotionally abused: Not on file     Physically abused: Not on file     Forced sexual activity: Not on file   Other Topics Concern     Parent/sibling w/ CABG, MI or angioplasty before 65F 55M? Not Asked   Social History Narrative     Not on file       Family History      Family History   Problem Relation Age of Onset     Cerebrovascular Disease Mother      Heart Disease Mother         PFO repair     Cancer Maternal Grandmother         kidney     Diabetes Maternal Uncle      No Known Problems Father        ROS     A 10 organ review of systems was asked and the pertinent positives and negatives are listed in the HPI.     Physical Exam   Vitals: /74 (BP Location: Right arm, Cuff Size: Adult Regular)   Pulse 68   Ht 1.708 m (5' 7.25\")   Wt 74.8 kg (165 lb)   LMP 02/06/2021   SpO2 100%   BMI 25.65 kg/m      General: Comfortable, no obvious distress, normal  body habitus  HEENT: Sclera clear.  Trachea midline.   Psych: Alert. Appropriate affect,.  Normal speech.   : deferred        This is a new diagnosis with uncertain etiology.  Labs and ultrasound ordered    Shona Lu MD       "

## 2021-03-02 DIAGNOSIS — Z31.41 FERTILITY TESTING: Primary | ICD-10-CM

## 2021-03-22 ENCOUNTER — PRENATAL OFFICE VISIT (OUTPATIENT)
Dept: OBGYN | Facility: CLINIC | Age: 31
End: 2021-03-22
Payer: COMMERCIAL

## 2021-03-22 VITALS — BODY MASS INDEX: 25.11 KG/M2 | WEIGHT: 160 LBS | HEIGHT: 67 IN

## 2021-03-22 DIAGNOSIS — Z23 NEED FOR TDAP VACCINATION: ICD-10-CM

## 2021-03-22 DIAGNOSIS — Z34.01 ENCOUNTER FOR SUPERVISION OF NORMAL FIRST PREGNANCY IN FIRST TRIMESTER: Primary | ICD-10-CM

## 2021-03-22 DIAGNOSIS — Z34.00 PRENATAL CARE, FIRST PREGNANCY: ICD-10-CM

## 2021-03-22 PROCEDURE — 99207 PR NO CHARGE NURSE ONLY: CPT

## 2021-03-22 RX ORDER — PRENATAL VIT/IRON FUM/FOLIC AC 27MG-0.8MG
1 TABLET ORAL DAILY
COMMUNITY

## 2021-03-22 ASSESSMENT — MIFFLIN-ST. JEOR: SCORE: 1483.39

## 2021-03-22 NOTE — PROGRESS NOTES
Important Information for Provider: Early ultrasound okay'd by Dr. Lu. Patient traveling to [place] middle of April- travel precautions reviewed.    Patient presents for New Prenatal Intake and Education. This is patient's 1st pregnancy. Handouts should be given. Scheduled for New Prenatal with Dr. Lu on 4/29/21.       Prenatal OB Questionnaire  Patient supplied answers from flow sheet for:  Prenatal OB Questionnaire.  Past Medical History  Diabetes?: (P) No  Hypertension : (P) No  Heart disease, mitral valve prolapse or rheumatic fever?: (P) No  An autoimmune disease such as lupus or rheumatoid arthritis?: (P) No  Kidney disease or urinary tract infection?: (P) No  Epilepsy, seizures or spells?: (P) No  Migraine headaches?: (!) (P) Yes- no medications  A stroke or loss of function or sensation?: (P) No  Any other neurological problems?: (P) No  Have you ever recieved care for your mental health? : (P) No  Are you having problems with crying spells or loss of self-esteem?: (P) No  Have you ever required psychiatric care?: (P) No  Have you ever had hepatitis, liver disease or jaundice?: (P) No  Have you been treated for blood clots in your veins, deep vein thromosis, inflammation in the veins, thrombosis, phlebitis, pulmonary embolism or varicosities?: (P) No  Have you had excessive bleeding after surgery or dental work?: (P) No  Do you bleed more than other women after a cut or scratch?: (P) No  Do you have a history of anemia?: (P) No  Have you ever had thyroid problems or taken thyroid medication?: (!) (P) Yes- 2012, not currently   Do you have any endocrine problems?: (P) No  Have you ever been in a major accident or suffered serious trauma?: (P) No  Within the last year, has anyone hit, slapped, kicked or otherwise hurt you?: (P) No  In the last year, has anyone forced you to have sex when you didn't want to?: (P) No    Past Medical History 2   Have you ever received a blood transfusion?: (P) No  Would  you accept a blood transfusion if was medically recommended?: (P) No   If you answered Yes, would you rather die than receive a blood transfusion?: (P) No  If you answered Yes, is this for Taoism reasons?: (P) No  Does anyone in your home smoke?: (P) No  Do you use tobacco products?: (P) No  Do you drink beer, wine or hard liquor?: (P) No  Do you use any of the following: marijuana, speed, cocaine, heroin, hallucinogens or other drugs?: (P) No   Is your blood type Rh negative?: (P) Unknown  Have you ever had abnormal antibodies in your blood?: (P) Unknown  Have you ever had asthma?: (!) (P) Yes  Have you ever had tuberculosis?: (P) No  Do you have any allergies to drugs or over-the-counter medications?: (P) Unknown  Allergies: Dust Mites, Aspartame, Ethanol, Venlafaxine, Hydrochloride, Sertraline: (P) No  Have you had any breast problems?: (P) No  Have you ever ?: (P) No  Have you had any gynecological surgical procedures such as cervical conization, a LEEP procedure, laser treatment, cryosurgery of the cervix or a dilation and curettage, etc?: (P) No  Have you ever had any other surgical procedures?: (P) No  Have you been hospitalized for a nonsurgical reason excluding normal delivery?: (P) No  Have you ever had any anesthetic complications?: (P) No  Have you ever had an abnormal pap smear?: (P) No    Past Medical History (Continued)  Do you have a history of abnormalities of the uterus?: (P) No  Did your mother take MONA or any other hormones when she was pregnant with you?: (P) Unknown  Did it take you more than a year to become pregnant?: (!) (P) Yes  Have you ever been evaluated or treated for infertility?: (P) No  Is there a history of medical problems in your family, which you feel may be important to this pregnancy?: (P) No  Do you have any other problems we have not asked about which you feel may be important to this pregnancy?: (P) No    Symptoms since last menstrual period  Do you have any of  the following symptoms: abdominal pain, blood in stools or urine, chest pain, shortness of breath, coughing or vomiting up blood, your heart racing or skipping beats, nausea and vomiting, pain on urination or vaginal discharge or bleed: (P) No  Current medications, including over-the-counter medications, you are using? (If not applicable answer none): (P) Prenatal vitamins  Will the patient be 35 years old or older at the time of delivery?: (P) No    Has the patient, baby's father or anyone in either family had:  Thalassemia (Italian, Greek, Mediterranean or  background only) and an MCV result less than 80?: No  Neural tube defect such as meningomyelocele, spina bifida or anencephaly?: No  Congenital heart defect?: No  Down's Syndrome?: No  Sanjay-Sachs disease (Yazidism, Cajun, Slovenian-[place])?: No  Sickle cell disease or trait ()?: No  Hemophilia or other inherited problems of blood?: No  Muscular dystrophy?: No  Cystic fibrosis?: No  Karoline's chorea?: No  Mental retardation/autism?: No  If yes, was the person tested for fragile X?: No  Any other inherited genetic or chromosomal disorder?: No  Maternal metabolic disorder (e.g Insulin-dependent diabetes, PKU)?: No  A child with birth defects not listed above?: No  Recurrent pregnancy loss or stillbirth?: No   Has the patient had any medications/street drugs/alcohol since her last menstrual period?: No  Does the patient or baby's father have any other genetic risks?: No    Infection History   Do you object to being tested for Hepatitis B?: No  Do you object to being tested for HIV?: No   Do you feel that you are at high risk for coming in contact with the AIDS virus?: No  Have you ever been treated for tuberculosis?: No  Have you ever had a positive skin test for tuberculosis?: No  Do you have genital herpes?: No  Does your partner have genital herpes?: No  Have you ever had gonorrhea, chlamydia, syphilis, venereal warts, trichomoniasis, pelvic  inflammatory disease or any other sexually transmitted disease?: No  Do you know if you are a genital group B streptococcus carrier?: No  Have you had chicken pox/varicella?: (!) Yes   Have you been vaccinated against chicken Pox?: Unknown      Allergies as of 3/22/2021:    Allergies as of 03/22/2021 - Reviewed 03/01/2021   Allergen Reaction Noted     Benzoin  08/03/2011       Current medications are:  Current Outpatient Medications   Medication Sig Dispense Refill     albuterol (PROAIR HFA, PROVENTIL HFA, VENTOLIN HFA) 108 (90 BASE) MCG/ACT inhaler Inhale 2 puffs into the lungs every 6 hours as needed for shortness of breath / dyspnea or wheezing 1 Inhaler 1     IBUPROFEN PO Take by mouth as needed for moderate pain           Early ultrasound screening tool:    Does patient have irregular periods?  No  Did patient use hormonal birth control in the three months prior to positive urine pregnancy test? No  Is the patient breastfeeding?  No  Is the patient 10 weeks or greater at time of education visit?  No    Missy Gale LPN

## 2021-04-01 ENCOUNTER — ANCILLARY PROCEDURE (OUTPATIENT)
Dept: ULTRASOUND IMAGING | Facility: CLINIC | Age: 31
End: 2021-04-01
Attending: OBSTETRICS & GYNECOLOGY
Payer: COMMERCIAL

## 2021-04-01 DIAGNOSIS — Z34.00 NORMAL FIRST PREGNANCY WITH UNCERTAIN DATE OF LMP, ANTEPARTUM: ICD-10-CM

## 2021-04-01 PROCEDURE — 76817 TRANSVAGINAL US OBSTETRIC: CPT | Performed by: STUDENT IN AN ORGANIZED HEALTH CARE EDUCATION/TRAINING PROGRAM

## 2021-04-01 PROCEDURE — 76801 OB US < 14 WKS SINGLE FETUS: CPT | Performed by: STUDENT IN AN ORGANIZED HEALTH CARE EDUCATION/TRAINING PROGRAM

## 2021-04-18 ENCOUNTER — HEALTH MAINTENANCE LETTER (OUTPATIENT)
Age: 31
End: 2021-04-18

## 2021-04-29 ENCOUNTER — PRENATAL OFFICE VISIT (OUTPATIENT)
Dept: OBGYN | Facility: OTHER | Age: 31
End: 2021-04-29
Payer: COMMERCIAL

## 2021-04-29 VITALS
HEIGHT: 67 IN | BODY MASS INDEX: 25.43 KG/M2 | SYSTOLIC BLOOD PRESSURE: 122 MMHG | DIASTOLIC BLOOD PRESSURE: 72 MMHG | HEART RATE: 72 BPM | WEIGHT: 162 LBS

## 2021-04-29 DIAGNOSIS — Z34.01 ENCOUNTER FOR SUPERVISION OF NORMAL FIRST PREGNANCY IN FIRST TRIMESTER: ICD-10-CM

## 2021-04-29 DIAGNOSIS — Z34.01 ENCOUNTER FOR PRENATAL CARE IN FIRST TRIMESTER OF FIRST PREGNANCY: Primary | ICD-10-CM

## 2021-04-29 LAB
ALBUMIN UR-MCNC: NEGATIVE MG/DL
APPEARANCE UR: CLEAR
BILIRUB UR QL STRIP: NEGATIVE
COLOR UR AUTO: YELLOW
ERYTHROCYTE [DISTWIDTH] IN BLOOD BY AUTOMATED COUNT: 13.1 % (ref 10–15)
GLUCOSE UR STRIP-MCNC: NEGATIVE MG/DL
HBV SURFACE AG SERPL QL IA: NONREACTIVE
HCT VFR BLD AUTO: 43.1 % (ref 35–47)
HGB BLD-MCNC: 14.3 G/DL (ref 11.7–15.7)
HGB UR QL STRIP: ABNORMAL
HIV 1+2 AB+HIV1 P24 AG SERPL QL IA: NONREACTIVE
HYALINE CASTS #/AREA URNS LPF: ABNORMAL /LPF
KETONES UR STRIP-MCNC: NEGATIVE MG/DL
LEUKOCYTE ESTERASE UR QL STRIP: NEGATIVE
MCH RBC QN AUTO: 29.4 PG (ref 26.5–33)
MCHC RBC AUTO-ENTMCNC: 33.2 G/DL (ref 31.5–36.5)
MCV RBC AUTO: 89 FL (ref 78–100)
NITRATE UR QL: NEGATIVE
NON-SQ EPI CELLS #/AREA URNS LPF: ABNORMAL /LPF
PH UR STRIP: 5.5 PH (ref 5–7)
PLATELET # BLD AUTO: 276 10E9/L (ref 150–450)
RBC # BLD AUTO: 4.86 10E12/L (ref 3.8–5.2)
RBC #/AREA URNS AUTO: ABNORMAL /HPF
SOURCE: ABNORMAL
SP GR UR STRIP: 1.02 (ref 1–1.03)
T PALLIDUM AB SER QL: NONREACTIVE
UROBILINOGEN UR STRIP-ACNC: 0.2 EU/DL (ref 0.2–1)
WBC # BLD AUTO: 9.5 10E9/L (ref 4–11)
WBC #/AREA URNS AUTO: ABNORMAL /HPF

## 2021-04-29 PROCEDURE — 86900 BLOOD TYPING SEROLOGIC ABO: CPT | Performed by: OBSTETRICS & GYNECOLOGY

## 2021-04-29 PROCEDURE — 87086 URINE CULTURE/COLONY COUNT: CPT | Performed by: OBSTETRICS & GYNECOLOGY

## 2021-04-29 PROCEDURE — 87389 HIV-1 AG W/HIV-1&-2 AB AG IA: CPT | Performed by: OBSTETRICS & GYNECOLOGY

## 2021-04-29 PROCEDURE — 86850 RBC ANTIBODY SCREEN: CPT | Performed by: OBSTETRICS & GYNECOLOGY

## 2021-04-29 PROCEDURE — 87340 HEPATITIS B SURFACE AG IA: CPT | Performed by: OBSTETRICS & GYNECOLOGY

## 2021-04-29 PROCEDURE — 86780 TREPONEMA PALLIDUM: CPT | Mod: 90 | Performed by: OBSTETRICS & GYNECOLOGY

## 2021-04-29 PROCEDURE — 86762 RUBELLA ANTIBODY: CPT | Performed by: OBSTETRICS & GYNECOLOGY

## 2021-04-29 PROCEDURE — 36415 COLL VENOUS BLD VENIPUNCTURE: CPT | Performed by: OBSTETRICS & GYNECOLOGY

## 2021-04-29 PROCEDURE — 85027 COMPLETE CBC AUTOMATED: CPT | Performed by: OBSTETRICS & GYNECOLOGY

## 2021-04-29 PROCEDURE — 99207 PR FIRST OB VISIT: CPT | Performed by: OBSTETRICS & GYNECOLOGY

## 2021-04-29 PROCEDURE — 81001 URINALYSIS AUTO W/SCOPE: CPT | Performed by: OBSTETRICS & GYNECOLOGY

## 2021-04-29 PROCEDURE — 86901 BLOOD TYPING SEROLOGIC RH(D): CPT | Performed by: OBSTETRICS & GYNECOLOGY

## 2021-04-29 PROCEDURE — 99000 SPECIMEN HANDLING OFFICE-LAB: CPT | Performed by: OBSTETRICS & GYNECOLOGY

## 2021-04-29 ASSESSMENT — MIFFLIN-ST. JEOR: SCORE: 1491.42

## 2021-04-29 NOTE — PROGRESS NOTES
31 year old  at 11w5d presents for New OB appointment.  Estimated Date of Delivery: 2021 by LMP which is exact.      US 2021: 8w 1d - 11/10/21    No complaints.   No vaginal bleeding    Electronic chart, including labs and imaging reviewed.    Last PHQ-9 score on record= No flowsheet data found.    Obstetric History  OB History    Para Term  AB Living   1 0 0 0 0 0   SAB TAB Ectopic Multiple Live Births   0 0 0 0 0      # Outcome Date GA Lbr Con/2nd Weight Sex Delivery Anes PTL Lv   1 Current                  Most Recent Immunizations   Administered Date(s) Administered     TDAP Vaccine (Adacel) 2020        Patient Active Problem List   Diagnosis     CARDIOVASCULAR SCREENING; LDL GOAL LESS THAN 160     Bony exostosis     Intermittent asthma     Migraine headache without aura     Prenatal care, first pregnancy     Need for Tdap vaccination       Current Outpatient Medications   Medication     Prenatal Vit-Fe Fumarate-FA (PRENATAL MULTIVITAMIN W/IRON) 27-0.8 MG tablet     No current facility-administered medications for this visit.        Allergies   Allergen Reactions     Beeswax Swelling     Benzoin        History  Past Medical History:   Diagnosis Date     History of hypothyroidism 2014     Migraines      Thyroid disease      Uncomplicated asthma     exercised enduced     Past Surgical History:   Procedure Laterality Date     bunionectomy, left  2008    outside of [place]     EXCISE EXOSTOSIS FOOT  2014    Procedure: EXCISE EXOSTOSIS FOOT;  Exostectomy left first metatarsal;  Surgeon: Flako Colin DPM;  Location: [facility]     wisdom teeth extraction Bilateral        Social History     Socioeconomic History     Marital status:      Spouse name: Not on file     Number of children: Not on file     Years of education: Not on file     Highest education level: Not on file   Occupational History     Not on file   Social Needs     Financial resource  "strain: Not on file     Food insecurity     Worry: Not on file     Inability: Not on file     Transportation needs     Medical: Not on file     Non-medical: Not on file   Tobacco Use     Smoking status: Never Smoker     Smokeless tobacco: Never Used   Substance and Sexual Activity     Alcohol use: Not Currently     Drug use: No     Sexual activity: Yes     Partners: Male     Birth control/protection: None   Lifestyle     Physical activity     Days per week: Not on file     Minutes per session: Not on file     Stress: Not on file   Relationships     Social connections     Talks on phone: Not on file     Gets together: Not on file     Attends Faith service: Not on file     Active member of club or organization: Not on file     Attends meetings of clubs or organizations: Not on file     Relationship status: Not on file     Intimate partner violence     Fear of current or ex partner: Not on file     Emotionally abused: Not on file     Physically abused: Not on file     Forced sexual activity: Not on file   Other Topics Concern     Parent/sibling w/ CABG, MI or angioplasty before 65F 55M? Not Asked   Social History Narrative     Not on file       ROS - Please see HPI, otherwise 10pt ROS negative.      Physical Exam  Vitals: /72 (BP Location: Right arm, Cuff Size: Adult Regular)   Pulse 72   Ht 1.708 m (5' 7.25\")   Wt 73.5 kg (162 lb)   LMP 02/06/2021 (Exact Date)   BMI 25.18 kg/m    BMI= Body mass index is 25.18 kg/m .  Gen: Alert and oriented times 3, no acute distress.  Well developed, well nourished, pleasant.    Neck: Supple, no masses.  No thyromegaly.  Chest:  Non labored.  Clear to auscultation bilaterally.    Heart: Regular, normal S1, S2.  No murmurs.   Abdomen: Soft, nontender, nondistended.  No palpable masses.    :  Normal female external genitalia, Eric stage V.  No lesions.  Speculum exam reveals a normal vaginal vault, normal cervix.  No abnormal discharge.  Bimanual exam reveals a " normal, mobile, nontender uterus, size consistent with dates.  No cervical motion tenderness.  Adnexa nontender with no palpable masses.   Extremities:  Nontender, no edema.      TSH   Date Value Ref Range Status   2021 1.80 0.40 - 4.00 mU/L Final        Assessment and Plan:  31 year old  with IUP at 11w5d.        ICD-10-CM    1. Encounter for prenatal care in first trimester of first pregnancy  Z34.01 UA with Microscopic   2. Encounter for supervision of normal first pregnancy in first trimester  Z34.01 Urine Culture Aerobic Bacterial     Treponema Abs w Reflex to RPR and Titer     Rubella Antibody IgG Quantitative     HIV Antigen Antibody Combo     CBC with platelets     Hepatitis B surface antigen     ABO/Rh type and screen       History of thyroid problems.  Recent testing normal.  No meds.    Asthma in pregnancy - stable.   Discussed genetic screening options:  They will discuss and let me know.    Ordered labs  Folder given, outlining physician coverage, exercise, weight gain, schedule of visits, routine and indicated ultrasounds, prenatal vitamins and childbirth education.   Body mass index is 25.18 kg/m . - recommend 25-35 lbs (BMI 18.5-24.9)   Return in 4 weeks for routine OB care      30 minutes was spent face to face with the patient today discussing her history, diagnosis, and follow-up plan as noted above.  Over 50% of the visit was spent in counseling and coordination of care.    Shona Lu MD FACOG

## 2021-04-30 LAB
ABO + RH BLD: NORMAL
ABO + RH BLD: NORMAL
BACTERIA SPEC CULT: NO GROWTH
BLD GP AB SCN SERPL QL: NORMAL
BLOOD BANK CMNT PATIENT-IMP: NORMAL
RUBV IGG SERPL IA-ACNC: 19 IU/ML
SPECIMEN EXP DATE BLD: NORMAL
SPECIMEN SOURCE: NORMAL

## 2021-06-03 ENCOUNTER — PRENATAL OFFICE VISIT (OUTPATIENT)
Dept: OBGYN | Facility: OTHER | Age: 31
End: 2021-06-03
Payer: COMMERCIAL

## 2021-06-03 VITALS
SYSTOLIC BLOOD PRESSURE: 120 MMHG | OXYGEN SATURATION: 100 % | DIASTOLIC BLOOD PRESSURE: 78 MMHG | BODY MASS INDEX: 25.3 KG/M2 | WEIGHT: 162.75 LBS | HEART RATE: 78 BPM

## 2021-06-03 DIAGNOSIS — Z34.02 ENCOUNTER FOR PRENATAL CARE IN SECOND TRIMESTER OF FIRST PREGNANCY: Primary | ICD-10-CM

## 2021-06-03 PROCEDURE — 99207 PR PRENATAL VISIT: CPT | Performed by: OBSTETRICS & GYNECOLOGY

## 2021-06-03 NOTE — PROGRESS NOTES
Presents for routine  appointment.     No complaints aside from some pressure in her low abdomen and a cramp in her left side.    No abnormal discharge , no leaking fluid , no contractions , no vaginal bleeding    ROS:   and GI  negative.     Please see Prenatal Vitals and Notes Flowsheet for objective data.    A/P:  31 year old  at 16w5d       ICD-10-CM    1. Encounter for prenatal care in second trimester of first pregnancy  Z34.02 US OB > 14 Weeks       Genetic screening - declined.   History of thyroid problems.  Recent testing normal.  No meds.  Repeat with GCT  Asthma in pregnancy - stable.   Follow up in 4  week(s).      Shona Lu MD

## 2021-06-30 ENCOUNTER — PRENATAL OFFICE VISIT (OUTPATIENT)
Dept: OBGYN | Facility: CLINIC | Age: 31
End: 2021-06-30
Payer: COMMERCIAL

## 2021-06-30 VITALS
BODY MASS INDEX: 26.15 KG/M2 | WEIGHT: 168.2 LBS | HEART RATE: 93 BPM | OXYGEN SATURATION: 100 % | DIASTOLIC BLOOD PRESSURE: 77 MMHG | SYSTOLIC BLOOD PRESSURE: 133 MMHG

## 2021-06-30 DIAGNOSIS — Z34.02 ENCOUNTER FOR SUPERVISION OF NORMAL FIRST PREGNANCY IN SECOND TRIMESTER: Primary | ICD-10-CM

## 2021-06-30 DIAGNOSIS — O46.92 ANTEPARTUM BLEEDING, SECOND TRIMESTER: ICD-10-CM

## 2021-06-30 PROCEDURE — 99207 PR PRENATAL VISIT: CPT | Performed by: ADVANCED PRACTICE MIDWIFE

## 2021-06-30 ASSESSMENT — PAIN SCALES - GENERAL: PAINLEVEL: NO PAIN (0)

## 2021-06-30 NOTE — PROGRESS NOTES
Here because she had two episodes of pink discharge when wiping.   No recent intercourse.  Non constipated BM a few hours before.   No cramping.   Spec exam with no obvious cause for the bleeding.  Externally or internally.  Cx not friable no polyp and discharge is creamy white.   Has ultrasound scheduled for tomorrow.    No intercourse until placenta is located.   Discussed relief measures for constipation,.   RTC 1 week.   Nessa Toussaint, FABIOLA, CNM

## 2021-07-01 ENCOUNTER — ANCILLARY PROCEDURE (OUTPATIENT)
Dept: ULTRASOUND IMAGING | Facility: OTHER | Age: 31
End: 2021-07-01
Attending: OBSTETRICS & GYNECOLOGY
Payer: COMMERCIAL

## 2021-07-01 DIAGNOSIS — Z34.02 ENCOUNTER FOR PRENATAL CARE IN SECOND TRIMESTER OF FIRST PREGNANCY: ICD-10-CM

## 2021-07-01 PROCEDURE — 76805 OB US >/= 14 WKS SNGL FETUS: CPT | Performed by: RADIOLOGY

## 2021-07-08 ENCOUNTER — PRENATAL OFFICE VISIT (OUTPATIENT)
Dept: OBGYN | Facility: OTHER | Age: 31
End: 2021-07-08
Payer: COMMERCIAL

## 2021-07-08 VITALS
SYSTOLIC BLOOD PRESSURE: 124 MMHG | DIASTOLIC BLOOD PRESSURE: 74 MMHG | HEART RATE: 86 BPM | OXYGEN SATURATION: 100 % | WEIGHT: 166.5 LBS | BODY MASS INDEX: 25.88 KG/M2

## 2021-07-08 DIAGNOSIS — Z34.02 ENCOUNTER FOR SUPERVISION OF NORMAL FIRST PREGNANCY IN SECOND TRIMESTER: Primary | ICD-10-CM

## 2021-07-08 PROCEDURE — 99207 PR PRENATAL VISIT: CPT | Performed by: OBSTETRICS & GYNECOLOGY

## 2021-07-08 NOTE — PROGRESS NOTES
Presents for routine  appointment.     No complaints.  She saw Nessa for bleeding last week. Exam was normal at that time.  No abnormal discharge , no leaking fluid , no contractions , no vaginal bleeding    ROS:   and GI  negative.     Please see Prenatal Vitals and Notes Flowsheet for objective data.  Lab Results   Component Value Date    ABO O 2021    RH Neg 2021       A/P:  31 year old  at 21w5d       ICD-10-CM    1. Encounter for supervision of normal first pregnancy in second trimester  Z34.02 Glucose tolerance gest screen 1 hour     ABO/Rh type and screen     Hemoglobin       Discussed ultrasound results, which were normal.  GCT, hgb greater or equal to 24 weeks    is rh negative, but she will still plan on Rhogam at 28 weeks.  Follow up in 4  weeks.      Shona Lu MD

## 2021-07-29 ENCOUNTER — PRENATAL OFFICE VISIT (OUTPATIENT)
Dept: OBGYN | Facility: OTHER | Age: 31
End: 2021-07-29
Payer: COMMERCIAL

## 2021-07-29 VITALS
BODY MASS INDEX: 26.39 KG/M2 | WEIGHT: 169.75 LBS | HEART RATE: 78 BPM | SYSTOLIC BLOOD PRESSURE: 122 MMHG | DIASTOLIC BLOOD PRESSURE: 68 MMHG | OXYGEN SATURATION: 99 %

## 2021-07-29 DIAGNOSIS — Z34.02 ENCOUNTER FOR SUPERVISION OF NORMAL FIRST PREGNANCY IN SECOND TRIMESTER: ICD-10-CM

## 2021-07-29 LAB
GLUCOSE 1H P 50 G GLC PO SERPL-MCNC: 156 MG/DL (ref 70–129)
HGB BLD-MCNC: 11.9 G/DL (ref 11.7–15.7)

## 2021-07-29 PROCEDURE — 82952 GTT-ADDED SAMPLES: CPT | Performed by: OBSTETRICS & GYNECOLOGY

## 2021-07-29 PROCEDURE — 99207 PR PRENATAL VISIT: CPT | Performed by: OBSTETRICS & GYNECOLOGY

## 2021-07-29 PROCEDURE — 85018 HEMOGLOBIN: CPT | Performed by: OBSTETRICS & GYNECOLOGY

## 2021-07-29 PROCEDURE — 36415 COLL VENOUS BLD VENIPUNCTURE: CPT | Performed by: OBSTETRICS & GYNECOLOGY

## 2021-07-29 NOTE — PROGRESS NOTES
Presents for routine  appointment.     No complaints.    No abnormal discharge , no leaking fluid , no contractions , no vaginal bleeding    ROS:   and GI  negative.     Please see Prenatal Vitals and Notes Flowsheet for objective data.  Lab Results   Component Value Date    ABO O 2021    RH Neg 2021       A/P:  31 year old  at 24w5d       ICD-10-CM    1. Encounter for supervision of normal first pregnancy in second trimester  Z34.02 Hemoglobin     ABO/Rh type and screen     Glucose tolerance gest screen 1 hour       1 hr glucola today.  She does have access to MyChart.  She is Rh negative.  Planning rhogam next visit.   TDAP  next visit.    Discussed signs and symptoms of  labor  Follow up in 4  weeks.      Shona Lu MD

## 2021-07-30 DIAGNOSIS — O99.810 ABNORMAL MATERNAL GLUCOSE TOLERANCE, ANTEPARTUM: Primary | ICD-10-CM

## 2021-08-04 ENCOUNTER — LAB (OUTPATIENT)
Dept: LAB | Facility: OTHER | Age: 31
End: 2021-08-04
Payer: COMMERCIAL

## 2021-08-04 DIAGNOSIS — O99.810 ABNORMAL MATERNAL GLUCOSE TOLERANCE, ANTEPARTUM: ICD-10-CM

## 2021-08-04 LAB
GESTATIONAL GTT 1 HR POST DOSE: 134 MG/DL (ref 60–179)
GESTATIONAL GTT 2 HR POST DOSE: 139 MG/DL (ref 60–154)
GESTATIONAL GTT 3 HR POST DOSE: 108 MG/DL (ref 60–139)
GLUCOSE P FAST SERPL-MCNC: 76 MG/DL (ref 60–94)

## 2021-08-04 PROCEDURE — 82952 GTT-ADDED SAMPLES: CPT

## 2021-08-04 PROCEDURE — 36415 COLL VENOUS BLD VENIPUNCTURE: CPT

## 2021-08-04 PROCEDURE — 82951 GLUCOSE TOLERANCE TEST (GTT): CPT

## 2021-08-25 ENCOUNTER — PRENATAL OFFICE VISIT (OUTPATIENT)
Dept: OBGYN | Facility: CLINIC | Age: 31
End: 2021-08-25
Payer: COMMERCIAL

## 2021-08-25 VITALS
BODY MASS INDEX: 27 KG/M2 | SYSTOLIC BLOOD PRESSURE: 123 MMHG | WEIGHT: 173.7 LBS | DIASTOLIC BLOOD PRESSURE: 81 MMHG | HEART RATE: 63 BPM

## 2021-08-25 DIAGNOSIS — Z34.02 SUPERVISION OF NORMAL FIRST PREGNANCY IN SECOND TRIMESTER: Primary | ICD-10-CM

## 2021-08-25 DIAGNOSIS — O26.893 RH NEGATIVE STATE IN ANTEPARTUM PERIOD, THIRD TRIMESTER: ICD-10-CM

## 2021-08-25 DIAGNOSIS — Z23 NEED FOR VACCINATION: ICD-10-CM

## 2021-08-25 DIAGNOSIS — Z67.91 RH NEGATIVE STATE IN ANTEPARTUM PERIOD, THIRD TRIMESTER: ICD-10-CM

## 2021-08-25 PROCEDURE — 90715 TDAP VACCINE 7 YRS/> IM: CPT | Performed by: OBSTETRICS & GYNECOLOGY

## 2021-08-25 PROCEDURE — 96372 THER/PROPH/DIAG INJ SC/IM: CPT | Performed by: OBSTETRICS & GYNECOLOGY

## 2021-08-25 PROCEDURE — 99207 PR PRENATAL VISIT: CPT | Performed by: OBSTETRICS & GYNECOLOGY

## 2021-08-25 PROCEDURE — 90471 IMMUNIZATION ADMIN: CPT | Performed by: OBSTETRICS & GYNECOLOGY

## 2021-08-25 NOTE — PATIENT INSTRUCTIONS
If you have any questions regarding your visit, Please contact your care team.    [facility]: [phone]      [facility] CLINIC HOURS TELEPHONE NUMBER   Heidi Torres DO.    Bri -  Maria Elena -     NARENDRA Petty RN     Monday, Wednesday, [facility]  8:30a.m-5:00 p.m   [facility]  [address] [place] [address]  [phone] ask for [facility]    Imaging [phone]       Urgent Care locations:    [facility] Saturday and Sunday   9 am - 5 pm    Monday-Friday   11 pm - 7 pm  Saturday and Sunday   9 am - 5 pm   [phone]     [facility] Labor and Delivery:  [phone]    If you need a medication refill, please contact your pharmacy. Please allow 3 business days for your refill to be completed.  As always, Thank you for trusting us with your healthcare needs!

## 2021-08-25 NOTE — PROGRESS NOTES
She reports feeling reassuring daily fetal activity and will continue to record.  She gained 4 lbs since her last visit and denies any fluid leakage or regular uterine contractions.  She received her rhogam injection per the MA prior to today's prenatal visit so the red cell antibody per test could not be checked.  However, the patient did say that her  has Rh negative as well.  She requested a Tdap vaccine and this was provided.  The results of her normal 3-hour GTT were reviewed with the patient.

## 2021-09-10 ENCOUNTER — PRENATAL OFFICE VISIT (OUTPATIENT)
Dept: OBGYN | Facility: CLINIC | Age: 31
End: 2021-09-10
Payer: COMMERCIAL

## 2021-09-10 VITALS
HEART RATE: 98 BPM | BODY MASS INDEX: 27.3 KG/M2 | DIASTOLIC BLOOD PRESSURE: 79 MMHG | SYSTOLIC BLOOD PRESSURE: 122 MMHG | OXYGEN SATURATION: 99 % | WEIGHT: 175.6 LBS

## 2021-09-10 DIAGNOSIS — Z34.03 ENCOUNTER FOR SUPERVISION OF NORMAL FIRST PREGNANCY, THIRD TRIMESTER: Primary | ICD-10-CM

## 2021-09-10 PROBLEM — Z23 NEED FOR TDAP VACCINATION: Status: RESOLVED | Noted: 2021-03-22 | Resolved: 2021-09-10

## 2021-09-10 PROBLEM — O46.92 ANTEPARTUM BLEEDING, SECOND TRIMESTER: Status: RESOLVED | Noted: 2021-06-30 | Resolved: 2021-09-10

## 2021-09-10 PROCEDURE — 99207 PR PRENATAL VISIT: CPT | Performed by: OBSTETRICS & GYNECOLOGY

## 2021-09-10 NOTE — PROGRESS NOTES
Presents for routine  appointment.     No complaints.    No abnormal discharge , no leaking fluid , no contractions , no vaginal bleeding    ROS:   and GI  negative.     Please see Prenatal Vitals and Notes Flowsheet for objective data.    A/P:  31 year old  at 30w6d       ICD-10-CM    1. Encounter for supervision of normal first pregnancy, third trimester  Z34.03        Tdap given at previous visit    Rhogam given last visit.  The antibody screen was ordered with her other labs that were drawn on 21 but this was not done.    Follow up in 2 weeks.      Shona Lu MD

## 2021-09-24 ENCOUNTER — PRENATAL OFFICE VISIT (OUTPATIENT)
Dept: OBGYN | Facility: CLINIC | Age: 31
End: 2021-09-24
Payer: COMMERCIAL

## 2021-09-24 VITALS
HEART RATE: 98 BPM | SYSTOLIC BLOOD PRESSURE: 126 MMHG | OXYGEN SATURATION: 100 % | WEIGHT: 177 LBS | DIASTOLIC BLOOD PRESSURE: 77 MMHG | BODY MASS INDEX: 27.52 KG/M2

## 2021-09-24 DIAGNOSIS — Z34.03 ENCOUNTER FOR SUPERVISION OF NORMAL FIRST PREGNANCY, THIRD TRIMESTER: Primary | ICD-10-CM

## 2021-09-24 PROCEDURE — 99207 PR PRENATAL VISIT: CPT | Performed by: OBSTETRICS & GYNECOLOGY

## 2021-09-24 NOTE — PROGRESS NOTES
Presents for routine  appointment.    No complaints.    No abnormal discharge  no leaking fluid  no contractions aside from BH , no vaginal bleeding    ROS:   and GI  negative.     Please see Prenatal Vitals and Notes Flowsheet for objective data.    A/P:  31 year old  at 32w6d       ICD-10-CM    1. Encounter for supervision of normal first pregnancy, third trimester  Z34.03        Tdap given at previous visit   She will think about the flu vaccine.   Follow up in 2 weeks.      Shona Lu MD

## 2021-10-02 ENCOUNTER — HEALTH MAINTENANCE LETTER (OUTPATIENT)
Age: 31
End: 2021-10-02

## 2021-10-11 ENCOUNTER — PRENATAL OFFICE VISIT (OUTPATIENT)
Dept: OBGYN | Facility: CLINIC | Age: 31
End: 2021-10-11
Payer: COMMERCIAL

## 2021-10-11 VITALS
WEIGHT: 179.2 LBS | SYSTOLIC BLOOD PRESSURE: 121 MMHG | HEART RATE: 96 BPM | OXYGEN SATURATION: 99 % | DIASTOLIC BLOOD PRESSURE: 73 MMHG | BODY MASS INDEX: 27.86 KG/M2

## 2021-10-11 DIAGNOSIS — Z34.03 ENCOUNTER FOR SUPERVISION OF NORMAL FIRST PREGNANCY, THIRD TRIMESTER: Primary | ICD-10-CM

## 2021-10-11 PROCEDURE — 99207 PR PRENATAL VISIT: CPT | Performed by: OBSTETRICS & GYNECOLOGY

## 2021-10-11 NOTE — PATIENT INSTRUCTIONS
You will have the opportunity to get a breast pump when you are on the postpartum floor after you deliver at the hospital.  We use [facility] at [facility] and have Spectra and Medela models.  If your insurance company does not cover [employer] [facility] [employer], then you will be provided a prescription upon discharge from the hospital.  You can take the prescription to the medical supply store that is covered by your insurance.    If you require a prescription prior to delivery, we can provide that at your next appointment.

## 2021-10-11 NOTE — PROGRESS NOTES
Presents for routine  appointment.     No complaints.    No abnormal discharge , no leaking fluid , no contractions , no vaginal bleeding    ROS:   and GI  negative.     Please see Prenatal Vitals and Notes Flowsheet for objective data.    A/P:  31 year old  at 35w2d       ICD-10-CM    1. Encounter for supervision of normal first pregnancy, third trimester  Z34.03        Reportable signs and symptoms discussed  Group B Strep next visit  Flu vaccine declined  Follow up in 1 week      Shona Lu MD

## 2021-10-19 ENCOUNTER — MYC MEDICAL ADVICE (OUTPATIENT)
Dept: OBGYN | Facility: CLINIC | Age: 31
End: 2021-10-19

## 2021-10-19 NOTE — PATIENT INSTRUCTIONS
If you have labs or imaging done, the results will automatically release in [employer] without an interpretation.  Your health care professional will review those results and send an interpretation with recommendations as soon as possible, but this may be 1-3 business days.    If you have any questions regarding your visit, please contact your care team.     [employer] [facility]: [phone]  [facility] CLINIC HOURS TELEPHONE NUMBER       MD Alie Rey - ELIANA Rutledge-NARENDRA Petty-NARENDRA Higgins-NARENDRA Gregory-  Maria Elena-     Monday- [place]  8:00 a.m - 5:00 p.m    Tuesday- Surgery    Wednesday- Admin    Thursday- [place]  8:00 a.m - 5:00 p.m.    Friday- [place]  7:30 a.m - 4:00 p.m. [facility]  [address]  [place] [address]  [phone] Fax  Imaging [phone]    [facility] Labor and Delivery  [address] [place] [address] [place] [address]  [phone]    [facility]  [address] [place] [address]  [phone] Fax  Imaging [phone]     Urgent Care locations:    [facility] Monday-Friday  10 am - 8 pm  Saturday and Sunday   9 am - 5 pm  Monday-Friday   10 am - 8 pm  Saturday and Sunday   9 am - 5 pm   [phone]     If you need a medication refill, please contact your pharmacy. Please allow 3 business days for your refill to be completed.    As always, thank you for trusting us with your healthcare needs!

## 2021-10-22 ENCOUNTER — PRENATAL OFFICE VISIT (OUTPATIENT)
Dept: OBGYN | Facility: CLINIC | Age: 31
End: 2021-10-22
Payer: COMMERCIAL

## 2021-10-22 VITALS
HEART RATE: 107 BPM | BODY MASS INDEX: 28.53 KG/M2 | WEIGHT: 183.5 LBS | OXYGEN SATURATION: 99 % | SYSTOLIC BLOOD PRESSURE: 128 MMHG | DIASTOLIC BLOOD PRESSURE: 78 MMHG

## 2021-10-22 DIAGNOSIS — Z34.03 ENCOUNTER FOR SUPERVISION OF NORMAL FIRST PREGNANCY, THIRD TRIMESTER: Primary | ICD-10-CM

## 2021-10-22 PROCEDURE — 99207 PR PRENATAL VISIT: CPT | Performed by: OBSTETRICS & GYNECOLOGY

## 2021-10-22 PROCEDURE — 87653 STREP B DNA AMP PROBE: CPT | Performed by: OBSTETRICS & GYNECOLOGY

## 2021-10-22 NOTE — PROGRESS NOTES
Presents for routine  appointment.     No complaints.    No abnormal discharge , no leaking fluid , no contractions , no vaginal bleeding    ROS:   and GI  negative.     Please see Prenatal Vitals and Notes Flowsheet for objective data.    A/P:  31 year old  at 36w6d       ICD-10-CM    1. Encounter for supervision of normal first pregnancy, third trimester  Z34.03 Group B strep PCR       Group B Strep collected today  Discussed labor and what to expect. Discussed when to go to the birth center.  Follow up in 1 week.      Shona Lu MD

## 2021-10-23 LAB
GP B STREP DNA SPEC QL NAA+PROBE: POSITIVE
PATIENT PENICILLIN, AMOXICILLIN, CEPHALOSPORINS ALLERGY: NO

## 2021-11-01 ENCOUNTER — PRENATAL OFFICE VISIT (OUTPATIENT)
Dept: OBGYN | Facility: CLINIC | Age: 31
End: 2021-11-01
Payer: COMMERCIAL

## 2021-11-01 VITALS
BODY MASS INDEX: 28.39 KG/M2 | OXYGEN SATURATION: 100 % | SYSTOLIC BLOOD PRESSURE: 119 MMHG | WEIGHT: 182.6 LBS | HEART RATE: 98 BPM | DIASTOLIC BLOOD PRESSURE: 77 MMHG

## 2021-11-01 DIAGNOSIS — Z34.03 ENCOUNTER FOR SUPERVISION OF NORMAL FIRST PREGNANCY, THIRD TRIMESTER: Primary | ICD-10-CM

## 2021-11-01 DIAGNOSIS — O99.820 GBS (GROUP B STREPTOCOCCUS CARRIER), +RV CULTURE, CURRENTLY PREGNANT: ICD-10-CM

## 2021-11-01 PROCEDURE — 99207 PR PRENATAL VISIT: CPT | Performed by: OBSTETRICS & GYNECOLOGY

## 2021-11-01 RX ORDER — BREAST PUMP
1 EACH MISCELLANEOUS ONCE
Qty: 1 EACH | Refills: 0 | Status: SHIPPED | OUTPATIENT
Start: 2021-11-01 | End: 2021-11-01

## 2021-11-01 NOTE — PATIENT INSTRUCTIONS
If you have labs or imaging done, the results will automatically release in [employer] without an interpretation.  Your health care professional will review those results and send an interpretation with recommendations as soon as possible, but this may be 1-3 business days.    If you have any questions regarding your visit, please contact your care team.     [employer] Access [facility]: [phone]  [facility] CLINIC HOURS TELEPHONE NUMBER       MD Alie Rey - ELIANA Rutledge-NARENDRA Petty-NARENDRA Higgins-NARENDRA Gregory-  Maria Elena-     Monday- [place]  8:00 a.m - 5:00 p.m    Tuesday- Surgery    Wednesday- Admin    Thursday- [place]  8:00 a.m - 5:00 p.m.    Friday- [place]   7:30 a.m - 4:00 p.m. [facility]  [address]  [place] [address]  [phone] Fax  Imaging [phone]    [facility] Labor and Delivery  [address] [place] [address] [place] [address]  [phone]    [facility]  [address] [place], [place] [address] [phone] Fax  Imaging [phone]     Urgent Care locations:    [facility] Monday-Friday  10 am - 8 pm  Saturday and Sunday   9 am - 5 pm  Monday-Friday   10 am - 8 pm  Saturday and Sunday   9 am - 5 pm   [phone]     If you need a medication refill, please contact your pharmacy. Please allow 3 business days for your refill to be completed.    As always, thank you for trusting us with your healthcare needs!

## 2021-11-01 NOTE — PROGRESS NOTES
Presents for routine  appointment.     No complaints.    No abnormal discharge , no leaking fluid , no contractions , no vaginal bleeding    ROS:   and GI  negative.     Please see Prenatal Vitals and Notes Flowsheet for objective data.      A/P:  31 year old  at 38w2d       ICD-10-CM    1. Encounter for supervision of normal first pregnancy, third trimester  Z34.03 Misc. Devices (BREAST PUMP) MISC   2. GBS (group B Streptococcus carrier), +RV culture, currently pregnant  O99.820        Labor precautions given   Group B Strep pos  Follow up in 1 week.      Shona Lu MD

## 2021-11-08 ENCOUNTER — PRENATAL OFFICE VISIT (OUTPATIENT)
Dept: OBGYN | Facility: CLINIC | Age: 31
End: 2021-11-08
Payer: COMMERCIAL

## 2021-11-08 VITALS
DIASTOLIC BLOOD PRESSURE: 79 MMHG | HEART RATE: 86 BPM | SYSTOLIC BLOOD PRESSURE: 131 MMHG | WEIGHT: 184.3 LBS | BODY MASS INDEX: 28.65 KG/M2 | OXYGEN SATURATION: 100 %

## 2021-11-08 DIAGNOSIS — O99.820 GBS (GROUP B STREPTOCOCCUS CARRIER), +RV CULTURE, CURRENTLY PREGNANT: Primary | ICD-10-CM

## 2021-11-08 DIAGNOSIS — O48.0 POST-TERM PREGNANCY, 40-42 WEEKS OF GESTATION: ICD-10-CM

## 2021-11-08 PROCEDURE — 99207 PR PRENATAL VISIT: CPT | Performed by: OBSTETRICS & GYNECOLOGY

## 2021-11-08 NOTE — PATIENT INSTRUCTIONS
Please call [phone] to schedule the biophysical profile     If you have labs or imaging done, the results will automatically release in [employer] without an interpretation.  Your health care professional will review those results and send an interpretation with recommendations as soon as possible, but this may be 1-3 business days.    If you have any questions regarding your visit, please contact your care team.     [employer] Services: [phone]  Women s Health CLINIC HOURS TELEPHONE NUMBER       MD Alie Rey - ELIANA Rutledge-NARENDRA Petty-NARENDRA Higgins-NARENDRA Gregory-  Maria Elena-     Monday- [place]  8:00 a.m - 5:00 p.m    Tuesday- Surgery    Wednesday- Admin    Thursday- [place]  8:00 a.m - 5:00 p.m.    Friday- [place]  7:30 a.m - 4:00 p.m. [facility]  [address]  [place] [address]  [phone] Fax  Imaging [phone]    [facility] Labor and Delivery  [address]  [place], [place] [address] [phone]    [address] [address] [phone] Fax  Imaging [phone]     Urgent Care locations:    [facility] Monday-Friday  10 am - 8 pm  Saturday and Sunday   9 am - 5 pm  Monday-Friday   10 am - 8 pm  Saturday and Sunday   9 am - 5 pm   [phone]     If you need a medication refill, please contact your pharmacy. Please allow 3 business days for your refill to be completed.    As always, thank you for trusting us with your healthcare needs!

## 2021-11-08 NOTE — PROGRESS NOTES
Presents for routine  appointment.     No complaints aside from BH  No abnormal discharge , no leaking fluid , no contractions , no vaginal bleeding    ROS:   and GI  negative.     Please see Prenatal Vitals and Notes Flowsheet for objective data.      A/P:  31 year old  at 39w2d       ICD-10-CM    1. GBS (group B Streptococcus carrier), +RV culture, currently pregnant  O99.820    2. Post-term pregnancy, 40-42 weeks of gestation  O48.0 US Fetal Biophys Prof w/o Non Stress Test       Labor precautions given  Flu vaccine declined.   Follow up in 1 week.      Shona Lu MD        
Statement Selected

## 2021-11-15 ENCOUNTER — PRENATAL OFFICE VISIT (OUTPATIENT)
Dept: OBGYN | Facility: CLINIC | Age: 31
End: 2021-11-15
Payer: COMMERCIAL

## 2021-11-15 ENCOUNTER — ANCILLARY PROCEDURE (OUTPATIENT)
Dept: ULTRASOUND IMAGING | Facility: CLINIC | Age: 31
End: 2021-11-15
Attending: OBSTETRICS & GYNECOLOGY
Payer: COMMERCIAL

## 2021-11-15 VITALS
BODY MASS INDEX: 28.65 KG/M2 | WEIGHT: 184.3 LBS | DIASTOLIC BLOOD PRESSURE: 79 MMHG | SYSTOLIC BLOOD PRESSURE: 128 MMHG | HEART RATE: 84 BPM | OXYGEN SATURATION: 100 %

## 2021-11-15 DIAGNOSIS — O99.820 GBS (GROUP B STREPTOCOCCUS CARRIER), +RV CULTURE, CURRENTLY PREGNANT: ICD-10-CM

## 2021-11-15 DIAGNOSIS — O48.0 POST-TERM PREGNANCY, 40-42 WEEKS OF GESTATION: Primary | ICD-10-CM

## 2021-11-15 DIAGNOSIS — O48.0 POST-TERM PREGNANCY, 40-42 WEEKS OF GESTATION: ICD-10-CM

## 2021-11-15 PROCEDURE — 76819 FETAL BIOPHYS PROFIL W/O NST: CPT | Performed by: RADIOLOGY

## 2021-11-15 PROCEDURE — 99207 PR PRENATAL VISIT: CPT | Performed by: OBSTETRICS & GYNECOLOGY

## 2021-11-15 NOTE — PROGRESS NOTES
Presents for routine  appointment.     No complaints.    No abnormal discharge , no leaking fluid , no contractions , no vaginal bleeding    ROS:   and GI  negative.     Please see Prenatal Vitals and Notes Flowsheet for objective data.      A/P:  31 year old  at 40w2d       ICD-10-CM    1. Post-term pregnancy, 40-42 weeks of gestation  O48.0    2. GBS (group B Streptococcus carrier), +RV culture, currently pregnant  O99.820        Labor precautions given   Discussed with Diya the risks, benefits and alternatives to induction.  We discussed cervical ripening.    Discussed the concerns related to uterine hyperstimulation and adverse fetal effects that can occur with eith a ripening agent or pitocin.    I discussed the expected timeline for her based on her presentation, but she understands that this is just an approximate.  She is given the opportunity to ask questions and have them answered.  She does wish to proceed  Cervical ripening scheduled for Friday, 2021.      Shona Lu MD

## 2021-11-15 NOTE — PATIENT INSTRUCTIONS
You are scheduled for cervical ripening on Friday 11/19/21 at 6pm.  Please call [phone] at 5 pm to confirm the arrival time.     If you have labs or imaging done, the results will automatically release in [employer] without an interpretation.  Your health care professional will review those results and send an interpretation with recommendations as soon as possible, but this may be 1-3 business days.    If you have any questions regarding your visit, please contact your care team.     [employer] Services: [phone]  Women s Health CLINIC HOURS TELEPHONE NUMBER       MD Alie Rey - ELIANA Rutledge-NARENDRA Petty-NARENDRA Higgins-NARENDRA Gregory-  Maria Elena-     Monday- [place]  8:00 a.m - 5:00 p.m    Tuesday- Surgery    Wednesday- Admin    Thursday- [place]  8:00 a.m - 5:00 p.m.    Friday- [place]  7:30 a.m - 4:00 p.m. [facility]  [address]  [place] [address]  [phone] Fax  Imaging [phone]    [facility] Labor and Delivery  [address] [place] [address] Dr.  [place], [place] [address] [phone]    [address] [address]  [phone] Fax  Imaging [phone]     Urgent Care locations:    [facility] Monday-Friday  10 am - 8 pm  Saturday and Sunday   9 am - 5 pm  Monday-Friday   10 am - 8 pm  Saturday and Sunday   9 am - 5 pm   [phone]     If you need a medication refill, please contact your pharmacy. Please allow 3 business days for your refill to be completed.    As always, thank you for trusting us with your healthcare needs!

## 2021-11-16 ENCOUNTER — LAB (OUTPATIENT)
Dept: LAB | Facility: CLINIC | Age: 31
End: 2021-11-16
Payer: COMMERCIAL

## 2021-11-16 DIAGNOSIS — O48.0 POST-TERM PREGNANCY, 40-42 WEEKS OF GESTATION: ICD-10-CM

## 2021-11-16 PROCEDURE — U0005 INFEC AGEN DETEC AMPLI PROBE: HCPCS

## 2021-11-16 PROCEDURE — U0003 INFECTIOUS AGENT DETECTION BY NUCLEIC ACID (DNA OR RNA); SEVERE ACUTE RESPIRATORY SYNDROME CORONAVIRUS 2 (SARS-COV-2) (CORONAVIRUS DISEASE [COVID-19]), AMPLIFIED PROBE TECHNIQUE, MAKING USE OF HIGH THROUGHPUT TECHNOLOGIES AS DESCRIBED BY CMS-2020-01-R: HCPCS

## 2021-11-17 LAB — SARS-COV-2 RNA RESP QL NAA+PROBE: NEGATIVE

## 2021-11-19 ENCOUNTER — TRANSFERRED RECORDS (OUTPATIENT)
Dept: HEALTH INFORMATION MANAGEMENT | Facility: CLINIC | Age: 31
End: 2021-11-19
Payer: COMMERCIAL

## 2021-12-24 ENCOUNTER — MEDICAL CORRESPONDENCE (OUTPATIENT)
Dept: HEALTH INFORMATION MANAGEMENT | Facility: CLINIC | Age: 31
End: 2021-12-24
Payer: COMMERCIAL

## 2021-12-30 ENCOUNTER — PRENATAL OFFICE VISIT (OUTPATIENT)
Dept: OBGYN | Facility: CLINIC | Age: 31
End: 2021-12-30
Payer: COMMERCIAL

## 2021-12-30 VITALS
DIASTOLIC BLOOD PRESSURE: 91 MMHG | WEIGHT: 162 LBS | HEART RATE: 110 BPM | SYSTOLIC BLOOD PRESSURE: 142 MMHG | BODY MASS INDEX: 25.18 KG/M2 | OXYGEN SATURATION: 97 %

## 2021-12-30 PROBLEM — Z34.02 ENCOUNTER FOR SUPERVISION OF NORMAL FIRST PREGNANCY IN SECOND TRIMESTER: Status: RESOLVED | Noted: 2021-06-30 | Resolved: 2021-12-30

## 2021-12-30 PROBLEM — Z34.00 PRENATAL CARE, FIRST PREGNANCY: Status: RESOLVED | Noted: 2021-03-22 | Resolved: 2021-12-30

## 2021-12-30 PROBLEM — O99.820 GBS (GROUP B STREPTOCOCCUS CARRIER), +RV CULTURE, CURRENTLY PREGNANT: Status: RESOLVED | Noted: 2021-11-01 | Resolved: 2021-12-30

## 2021-12-30 PROCEDURE — 99207 PR POST PARTUM EXAM: CPT | Performed by: OBSTETRICS & GYNECOLOGY

## 2021-12-30 ASSESSMENT — PATIENT HEALTH QUESTIONNAIRE - PHQ9
SUM OF ALL RESPONSES TO PHQ QUESTIONS 1-9: 3
SUM OF ALL RESPONSES TO PHQ QUESTIONS 1-9: 3
10. IF YOU CHECKED OFF ANY PROBLEMS, HOW DIFFICULT HAVE THESE PROBLEMS MADE IT FOR YOU TO DO YOUR WORK, TAKE CARE OF THINGS AT HOME, OR GET ALONG WITH OTHER PEOPLE: NOT DIFFICULT AT ALL

## 2021-12-30 NOTE — PROGRESS NOTES
Diya Lombardo is a 31 year old year old G 1 P 1 who is here today for a postpartum exam.    HPI:      Doing well and without signif sx or concerns. Currently breast and bottle (formula) feeding infant. Here today with infant daughter. Infant doing well. Contraceptive method planned is condoms and NFP. NSA since delivery. PP depression screening as noted. See PHQ-9. Score = 3.    Past medical, obstetrical, surgical, family and social history reviewed and as noted or updated in chart.     Allergies, meds and supplements are as noted or updated in chart.      ROS:     Systems reviewed include constitutional; breast;                 cardiac; respiratory; gastrointestinal; genitourinary;                                musculoskeletal; integumentary; psychological;                                hematologic/lymphatic and endocrine.                  These systems were negative for significant symptoms except                 for the following: none and see HPI.                                Exam:  VS as noted.                    Abd is                             normal or negative except for, or in particular noting, the following                pertinent findings: none. Wd A. Suture knot removed.       Assessment: Postpartum exam    Plan and Recommendations: Counseling included the following: Symptoms, problems and concerns reviewed. Discussed pregnancy, birth, future pregnancy plans, work plans and infant care issues.  Problem list updated and Pregnancy Episode closed. See orders. Return to clinic in 12 months.  30 minutes spent on the date of encounter doing chart review, history, examination, discussion with patient, and documentation, and further activities as noted, and review of appropriateness of decision-making for care.    Diya was seen today for post partum exam.    Diagnoses and all orders for this visit:    Routine postpartum follow-up        Cody Hu MD

## 2021-12-30 NOTE — PATIENT INSTRUCTIONS
If you have any questions regarding your visit, Please contact your care team.     [employer] [employer] Services: [phone]  Women s Health CLINIC HOURS TELEPHONE NUMBER       Cody Hu M.D.    Maty-NARENDRA Petty-NARENDRA Astudillo-Medical Assistant    Bri-  Maria Elena-     Monday-[place]  8:00a.m-4:45 p.m  Tuesday-[place]  9:00a.m-4:00 p.m  Wednesday-[place] 8:00a.m-4:45 p.m.  Thursday-[place]  8:00a.m-4:45 p.m.  Friday-[place]  8:00a.m-4:45 p.m. [facility]  [address] [place] [address] [phone] ask for [facility]  [phone] Fax  Imaging [phone]    [facility] Labor and Delivery  [address]  [place], [place] [address] [phone]    [facility]  [address]  [place] [address] [phone] ask [facility]  [phone] Fax  Imaging [phone]     Urgent Care locations:    [place]        [place] Monday-Friday  10 am - 8 pm  Saturday and Sunday   9 am - 5 pm  Monday-Friday   10 am - 8 pm  Saturday and Sunday   9 am - 5 pm   [phone]   If you need a medication refill, please contact your pharmacy. Please allow 3 business days for your refill to be completed.  As always, Thank you for trusting us with your healthcare needs!     Epicardial ablation started.

## 2021-12-31 ASSESSMENT — PATIENT HEALTH QUESTIONNAIRE - PHQ9: SUM OF ALL RESPONSES TO PHQ QUESTIONS 1-9: 3

## 2022-01-25 ENCOUNTER — MEDICAL CORRESPONDENCE (OUTPATIENT)
Dept: HEALTH INFORMATION MANAGEMENT | Facility: CLINIC | Age: 32
End: 2022-01-25
Payer: COMMERCIAL

## 2022-04-01 ENCOUNTER — MEDICAL CORRESPONDENCE (OUTPATIENT)
Dept: HEALTH INFORMATION MANAGEMENT | Facility: CLINIC | Age: 32
End: 2022-04-01
Payer: COMMERCIAL

## 2022-06-03 ENCOUNTER — MEDICAL CORRESPONDENCE (OUTPATIENT)
Dept: HEALTH INFORMATION MANAGEMENT | Facility: CLINIC | Age: 32
End: 2022-06-03
Payer: COMMERCIAL

## 2022-09-03 ENCOUNTER — HEALTH MAINTENANCE LETTER (OUTPATIENT)
Age: 32
End: 2022-09-03

## 2022-12-09 ENCOUNTER — MYC MEDICAL ADVICE (OUTPATIENT)
Dept: FAMILY MEDICINE | Facility: OTHER | Age: 32
End: 2022-12-09

## 2022-12-09 ENCOUNTER — NURSE TRIAGE (OUTPATIENT)
Dept: FAMILY MEDICINE | Facility: OTHER | Age: 32
End: 2022-12-09

## 2022-12-09 ENCOUNTER — E-VISIT (OUTPATIENT)
Dept: URGENT CARE | Facility: CLINIC | Age: 32
End: 2022-12-09
Payer: COMMERCIAL

## 2022-12-09 DIAGNOSIS — R21 RASH: Primary | ICD-10-CM

## 2022-12-09 PROCEDURE — 99421 OL DIG E/M SVC 5-10 MIN: CPT | Performed by: INTERNAL MEDICINE

## 2022-12-09 RX ORDER — TRIAMCINOLONE ACETONIDE 1 MG/G
CREAM TOPICAL 2 TIMES DAILY
Qty: 80 G | Refills: 0 | Status: SHIPPED | OUTPATIENT
Start: 2022-12-09 | End: 2023-08-14

## 2022-12-09 NOTE — TELEPHONE ENCOUNTER
Call from patient. She was calling to follow up on my chart message.     Hives developed after using essential oils on her neck. She has used benadryl and hydrocortisone cream with no effect.     Denies any chest tightness, difficulty breathing, or wheezing.     Advised e-visit for further treatment. Patient will submit e-visit.     Cira BALLARD, RN  [facility]      Reason for Disposition    Localized hives    Additional Information    Negative: Difficulty breathing or wheezing now    Negative: Rapid onset of swollen tongue    Negative: Rapid onset of hoarseness or cough    Negative: Very weak (can't stand)    Negative: Difficult to awaken or acting confused (e.g., disoriented, slurred speech)    Negative: Life-threatening reaction (anaphylaxis) in the past to similar substance (e.g., food, insect bite/sting, chemical, etc.) and < 2 hours since exposure    Negative: Sounds like a life-threatening emergency to the triager    Negative: Swollen tongue    Negative: Widespread hives, itching, or facial swelling and onset < 2 hours of exposure to high-risk allergen (e.g., 1st dose of antibiotic, nuts, sting)    Negative: Patient sounds very sick or weak to the triager    Negative: MODERATE-SEVERE hives persist (i.e., hives interfere with normal activities or work) and taking antihistamine (e.g., Benadryl, Claritin) > 24 hours    Negative: Hives have become worse and taking oral steroids (e.g., prednisone) > 24 hours    Negative: Abdominal pain    Negative: Joint swelling    Negative: Fever    Negative: Patient wants to be seen    Negative: Hives persist > 1 week    Negative: Widespread hives and onset > 2 hours of exposure to high-risk allergen (e.g., peanuts, tree nuts, fish, or shellfish)    Negative: Hives from food reaction and diagnosis never confirmed by a doctor (or NP/PA)    Negative: Hives has occurred 3 or more times in the last year and the cause is not known    Negative:  Hives from food reaction    Protocols used: HIVES-A-[place]

## 2022-12-09 NOTE — TELEPHONE ENCOUNTER
Call from patient. She was calling to follow up on my chart message.     See triage encounter.     Cira BALLARD, RN  [facility]

## 2022-12-09 NOTE — PATIENT INSTRUCTIONS
Dear Diya Lombardo    I have sent a prescription for a cream for you to use on your rash.  If you do not improve, you should be seen in person.  I also advise taking over the counter zyrtec to help with the itching.    Thanks for choosing us as your health care partner,    Adriana Baldwin MD

## 2023-03-24 NOTE — LETTER
[address]   [facility] [address]  Phone: [phone]    February 20, 2020        Diya Lombardo  [address] [address]          To whom it may concern:    RE: Diya Lombardo    Patient was seen and treated today at our clinic and missed work due to influenza. Please excuse her from work today and tomorrow (2/20-2/21). Thank you.    Please contact me for questions or concerns.      Sincerely,        Mike Yoon PA-C   No

## 2023-04-29 ENCOUNTER — HEALTH MAINTENANCE LETTER (OUTPATIENT)
Age: 33
End: 2023-04-29

## 2023-07-13 ENCOUNTER — VIRTUAL VISIT (OUTPATIENT)
Dept: OBGYN | Facility: CLINIC | Age: 33
End: 2023-07-13
Payer: COMMERCIAL

## 2023-07-13 VITALS — HEIGHT: 68 IN | BODY MASS INDEX: 25 KG/M2

## 2023-07-13 DIAGNOSIS — Z34.80 PRENATAL CARE, SUBSEQUENT PREGNANCY, UNSPECIFIED TRIMESTER: Primary | ICD-10-CM

## 2023-07-13 DIAGNOSIS — Z23 NEED FOR TDAP VACCINATION: ICD-10-CM

## 2023-07-13 PROCEDURE — 99207 PR NO CHARGE NURSE ONLY: CPT

## 2023-07-13 NOTE — PROGRESS NOTES
Telephone visit with patient for New Prenatal Intake and Education. This is patient's 2nd pregnancy. Handouts reviewed and will be provided at next prenatal appointment. Scheduled for New Prenatal with Dr Lu on 8/14/23.       Prenatal OB Questionnaire  Patient supplied answers from flow sheet for:  Prenatal OB Questionnaire.  Past Medical History  Have you ever recieved care for your mental health? : No  Have you ever been in a major accident or suffered serious trauma?: No  Within the last year, has anyone hit, slapped, kicked or otherwise hurt you?: No  In the last year, has anyone forced you to have sex when you didn't want to?: No    Past Medical History 2   Have you ever received a blood transfusion?: No  Would you accept a blood transfusion if was medically recommended?: Yes  Does anyone in your home smoke?: No   Is your blood type Rh negative?: (!) Yes  Have you ever ?: (!) Yes  Have you been hospitalized for a nonsurgical reason excluding normal delivery?: No  Have you ever had an abnormal pap smear?: No    Past Medical History (Continued)  Do you have a history of abnormalities of the uterus?: No  Did your mother take MONA or any other hormones when she was pregnant with you?: Unknown  Do you have any other problems we have not asked about which you feel may be important to this pregnancy?: (!) Yes (took cold medicine and had two drinks prior to a positive pregnancy test)                     Allergies as of 7/13/2023:    Allergies as of 07/13/2023 - Reviewed 07/13/2023   Allergen Reaction Noted    Beeswax Swelling 03/22/2021    Benzoin  08/03/2011       Current medications are:  Current Outpatient Medications   Medication Sig Dispense Refill    Prenatal Vit-Fe Fumarate-FA (PRENATAL MULTIVITAMIN W/IRON) 27-0.8 MG tablet Take 1 tablet by mouth daily      triamcinolone (KENALOG) 0.1 % external cream Apply topically 2 times daily 80 g 0         Early ultrasound screening tool:    Does patient have  irregular periods?  No  Did patient use hormonal birth control in the three months prior to positive urine pregnancy test? No  Is the patient breastfeeding?  No  Is the patient 10 weeks or greater at time of education visit?  No    PHQ-2 Score:         7/13/2023     1:14 PM 12/28/2021     4:29 AM   PHQ-2 ( 1999 [employer]izer)   Q1: Little interest or pleasure in doing things 0 0   Q2: Feeling down, depressed or hopeless 0 1   PHQ-2 Score 0 1   Q1: Little interest or pleasure in doing things  Not at all   Q2: Feeling down, depressed or hopeless  Several days   PHQ-2 Score  1     Shelby Adair LPN

## 2023-07-27 ENCOUNTER — ANCILLARY PROCEDURE (OUTPATIENT)
Dept: ULTRASOUND IMAGING | Facility: OTHER | Age: 33
End: 2023-07-27
Attending: OBSTETRICS & GYNECOLOGY
Payer: COMMERCIAL

## 2023-07-27 LAB — RADIOLOGIST FLAGS: NORMAL

## 2023-07-27 PROCEDURE — 76801 OB US < 14 WKS SINGLE FETUS: CPT | Mod: TC | Performed by: INTERNAL MEDICINE

## 2023-08-13 LAB
ABO/RH(D): NORMAL
ANTIBODY SCREEN: NEGATIVE
SPECIMEN EXPIRATION DATE: NORMAL

## 2023-08-14 ENCOUNTER — PRENATAL OFFICE VISIT (OUTPATIENT)
Dept: OBGYN | Facility: CLINIC | Age: 33
End: 2023-08-14
Payer: COMMERCIAL

## 2023-08-14 VITALS
WEIGHT: 160.8 LBS | HEIGHT: 67 IN | HEART RATE: 88 BPM | BODY MASS INDEX: 25.24 KG/M2 | SYSTOLIC BLOOD PRESSURE: 122 MMHG | DIASTOLIC BLOOD PRESSURE: 79 MMHG

## 2023-08-14 DIAGNOSIS — J45.909 UNCOMPLICATED ASTHMA, UNSPECIFIED ASTHMA SEVERITY, UNSPECIFIED WHETHER PERSISTENT: ICD-10-CM

## 2023-08-14 DIAGNOSIS — Z12.4 ENCOUNTER FOR SCREENING FOR CERVICAL CANCER: ICD-10-CM

## 2023-08-14 DIAGNOSIS — Z34.80 PRENATAL CARE, SUBSEQUENT PREGNANCY, UNSPECIFIED TRIMESTER: ICD-10-CM

## 2023-08-14 DIAGNOSIS — Z86.39 HISTORY OF HYPOTHYROIDISM: ICD-10-CM

## 2023-08-14 DIAGNOSIS — O34.219 HISTORY OF CESAREAN DELIVERY, CURRENTLY PREGNANT: Primary | ICD-10-CM

## 2023-08-14 LAB
ALBUMIN UR-MCNC: NEGATIVE MG/DL
APPEARANCE UR: CLEAR
BACTERIA #/AREA URNS HPF: ABNORMAL /HPF
BILIRUB UR QL STRIP: NEGATIVE
COLOR UR AUTO: NORMAL
ERYTHROCYTE [DISTWIDTH] IN BLOOD BY AUTOMATED COUNT: 13.4 % (ref 10–15)
GLUCOSE UR STRIP-MCNC: NEGATIVE MG/DL
HBV SURFACE AG SERPL QL IA: NONREACTIVE
HCT VFR BLD AUTO: 39.6 % (ref 35–47)
HGB BLD-MCNC: 13.2 G/DL (ref 11.7–15.7)
HGB UR QL STRIP: NEGATIVE
HIV 1+2 AB+HIV1 P24 AG SERPL QL IA: NONREACTIVE
KETONES UR STRIP-MCNC: NEGATIVE MG/DL
LEUKOCYTE ESTERASE UR QL STRIP: NEGATIVE
MCH RBC QN AUTO: 28.1 PG (ref 26.5–33)
MCHC RBC AUTO-ENTMCNC: 33.3 G/DL (ref 31.5–36.5)
MCV RBC AUTO: 84 FL (ref 78–100)
MUCOUS THREADS #/AREA URNS LPF: PRESENT /LPF
NITRATE UR QL: NEGATIVE
PH UR STRIP: 5.5 [PH] (ref 5–7)
PLATELET # BLD AUTO: 303 10E3/UL (ref 150–450)
RBC # BLD AUTO: 4.7 10E6/UL (ref 3.8–5.2)
RBC #/AREA URNS AUTO: ABNORMAL /HPF
RUBV IGG SERPL QL IA: 2.38 INDEX
RUBV IGG SERPL QL IA: POSITIVE
SP GR UR STRIP: 1.02 (ref 1–1.03)
SQUAMOUS #/AREA URNS AUTO: ABNORMAL /LPF
T PALLIDUM AB SER QL: NONREACTIVE
TSH SERPL DL<=0.005 MIU/L-ACNC: 1.93 UIU/ML (ref 0.3–4.2)
UROBILINOGEN UR STRIP-MCNC: NORMAL MG/DL
WBC # BLD AUTO: 8.3 10E3/UL (ref 4–11)
WBC #/AREA URNS AUTO: ABNORMAL /HPF

## 2023-08-14 PROCEDURE — 87389 HIV-1 AG W/HIV-1&-2 AB AG IA: CPT | Performed by: OBSTETRICS & GYNECOLOGY

## 2023-08-14 PROCEDURE — 86780 TREPONEMA PALLIDUM: CPT | Performed by: OBSTETRICS & GYNECOLOGY

## 2023-08-14 PROCEDURE — 86762 RUBELLA ANTIBODY: CPT | Performed by: OBSTETRICS & GYNECOLOGY

## 2023-08-14 PROCEDURE — 99213 OFFICE O/P EST LOW 20 MIN: CPT | Performed by: OBSTETRICS & GYNECOLOGY

## 2023-08-14 PROCEDURE — 81001 URINALYSIS AUTO W/SCOPE: CPT | Performed by: OBSTETRICS & GYNECOLOGY

## 2023-08-14 PROCEDURE — 36415 COLL VENOUS BLD VENIPUNCTURE: CPT | Performed by: OBSTETRICS & GYNECOLOGY

## 2023-08-14 PROCEDURE — 85027 COMPLETE CBC AUTOMATED: CPT | Performed by: OBSTETRICS & GYNECOLOGY

## 2023-08-14 PROCEDURE — G0145 SCR C/V CYTO,THINLAYER,RESCR: HCPCS | Performed by: OBSTETRICS & GYNECOLOGY

## 2023-08-14 PROCEDURE — 86900 BLOOD TYPING SEROLOGIC ABO: CPT | Performed by: OBSTETRICS & GYNECOLOGY

## 2023-08-14 PROCEDURE — 86901 BLOOD TYPING SEROLOGIC RH(D): CPT | Performed by: OBSTETRICS & GYNECOLOGY

## 2023-08-14 PROCEDURE — 86850 RBC ANTIBODY SCREEN: CPT | Performed by: OBSTETRICS & GYNECOLOGY

## 2023-08-14 PROCEDURE — 87624 HPV HI-RISK TYP POOLED RSLT: CPT | Performed by: OBSTETRICS & GYNECOLOGY

## 2023-08-14 PROCEDURE — 87088 URINE BACTERIA CULTURE: CPT | Performed by: OBSTETRICS & GYNECOLOGY

## 2023-08-14 PROCEDURE — 84443 ASSAY THYROID STIM HORMONE: CPT | Performed by: OBSTETRICS & GYNECOLOGY

## 2023-08-14 PROCEDURE — 87340 HEPATITIS B SURFACE AG IA: CPT | Performed by: OBSTETRICS & GYNECOLOGY

## 2023-08-14 PROCEDURE — 87086 URINE CULTURE/COLONY COUNT: CPT | Performed by: OBSTETRICS & GYNECOLOGY

## 2023-08-14 RX ORDER — ALBUTEROL SULFATE 90 UG/1
2 AEROSOL, METERED RESPIRATORY (INHALATION) EVERY 6 HOURS PRN
Qty: 18 G | Refills: 0 | Status: SHIPPED | OUTPATIENT
Start: 2023-08-14

## 2023-08-14 NOTE — PROGRESS NOTES
33 year old  at 11w3d presents for New OB appointment.  Estimated Date of Delivery: Mar 1, 2024 by LMP  Ultrasound 2023: EGA 9w 5d, BRENT 23.     No vaginal bleeding, no abnormal discharge, no pelvic pain.   No history of GDM or GHTN  History of CS for fetal distress, uncomplicated.  Occasional nausea relieved by eating.  Sciatica in left leg.   More frequent asthma exacerbations throughout the summer requiring inhaler.  Does not have a primary provider    History of migraines - tylenol usually helps.     Electronic chart, including labs and imaging reviewed.    Last PHQ-9 score on record=       2021     1:17 PM   PHQ-9 SCORE   PHQ-9 Total Score MyChart 3 (Minimal depression)   PHQ-9 Total Score 3       Obstetric History  OB History    Para Term  AB Living   2 1 1 0 0 1   SAB IAB Ectopic Multiple Live Births   0 0 0 0 1      # Outcome Date GA Lbr Con/2nd Weight Sex Delivery Anes PTL Lv   2 Current            1 Term 21 41w0d  3.572 kg (7 lb 14 oz) F CS-LTranv EPI  NATHALIA      Complications: Fetal Intolerance      Name: Ernestina       Most Recent Immunizations   Administered Date(s) Administered    COVID-19 Monovalent 18+ (Moderna) 2021    TDAP (Adacel,Boostrix) 2021    TDAP Vaccine (Adacel) 2020        Patient Active Problem List   Diagnosis    Intermittent asthma    Migraine headache without aura    Need for Tdap vaccination    History of  delivery, currently pregnant       Current Outpatient Medications   Medication    albuterol (PROAIR HFA/PROVENTIL HFA/VENTOLIN HFA) 108 (90 Base) MCG/ACT inhaler    Prenatal Vit-Fe Fumarate-FA (PRENATAL MULTIVITAMIN W/IRON) 27-0.8 MG tablet     No current facility-administered medications for this visit.       Allergies   Allergen Reactions    Beeswax Swelling    Benzoin        History  Past Medical History:   Diagnosis Date    History of hypothyroidism 2014    Migraines     Uncomplicated asthma     exercised  "induced     Past Surgical History:   Procedure Laterality Date    bunionectomy, left  2008    outside of [place]     SECTION      EXCISE EXOSTOSIS FOOT  2014    Procedure: EXCISE EXOSTOSIS FOOT;  Exostectomy left first metatarsal;  Surgeon: Flako Colin DPM;  Location: [facility] OR    wisdom teeth extraction Bilateral        Social History     Socioeconomic History    Marital status:      Spouse name: Ismael    Number of children: 1    Years of education: Not on file    Highest education level: Not on file   Occupational History    Occupation: insurance rep     Comment: [employer]   Tobacco Use    Smoking status: Never    Smokeless tobacco: Never   Vaping Use    Vaping Use: Never used   Substance and Sexual Activity    Alcohol use: Not Currently    Drug use: No    Sexual activity: Not Currently     Partners: Male     Birth control/protection: None   Other Topics Concern    Parent/sibling w/ CABG, MI or angioplasty before 65F 55M? Not Asked   Social History Narrative    Not on file     Social Determinants of Health     Financial Resource Strain: Not on file   Food Insecurity: Not on file   Transportation Needs: Not on file   Physical Activity: Not on file   Stress: Not on file   Social Connections: Not on file   Intimate Partner Violence: Not on file   Housing Stability: Not on file       ROS - Please see HPI, otherwise 10pt ROS negative.      Physical Exam  Vitals: /79   Pulse 88   Ht 1.702 m (5' 7\")   Wt 72.9 kg (160 lb 12.8 oz)   LMP 2023   BMI 25.18 kg/m    BMI= Body mass index is 25.18 kg/m .  Gen: Alert and oriented times 3, no acute distress.  Well developed, well nourished, pleasant.    Neck: Supple, no masses.  No thyromegaly.  Chest:  Non labored.  Clear to auscultation bilaterally.    Heart: Regular, normal S1, S2.  No murmurs.   Abdomen: Soft, nontender, nondistended.  No palpable masses.    :  Normal female external genitalia, Eric stage V.  No " lesions.  Speculum exam reveals a normal vaginal vault, normal cervix.  No abnormal discharge.  Bimanual exam reveals a normal, mobile, nontender uterus, size consistent with dates.  No cervical motion tenderness.  Adnexa nontender with no palpable masses.   Extremities:  Nontender, no edema.    Pap obtained Yes        Assessment and Plan:  33 year old  with IUP at 11w3d.      ICD-10-CM    1. History of  delivery, currently pregnant  O34.219       2. History of hypothyroidism  Z86.39 TSH with free T4 reflex     TSH with free T4 reflex      3. Uncomplicated asthma, unspecified asthma severity, unspecified whether persistent  J45.909 albuterol (PROAIR HFA/PROVENTIL HFA/VENTOLIN HFA) 108 (90 Base) MCG/ACT inhaler      4. Encounter for screening for cervical cancer  Z12.4 Pap screen with HPV - recommended age 30 - 65 years      5. Prenatal care, subsequent pregnancy, unspecified trimester  Z34.80 ABO/Rh type and screen     Hepatitis B surface antigen     CBC with platelets     HIV Antigen Antibody Combo     Rubella Antibody IgG     Treponema Abs w Reflex to RPR and Titer     Urine Culture Aerobic Bacterial     UA with Microscopic reflex to Culture - lab collect          Discussed genetic screening options: Patient leaning against it but will let us know if she is interested  Ordered labs and discussed ultrasound  Folder given, outlining physician coverage, exercise, weight gain, schedule of visits, routine and indicated ultrasounds, prenatal vitamins and childbirth education.    Body mass index is 25.18 kg/m . - recommend  15-25 lbs  weight gain.  Recommended establishing care with PCP for further asthma evaluation  Return in 4 weeks for routine OB care       Álvaro Randall, MS3      Physician Attestation   I, Shona Lu MD, was present with the medical student who participated in the service and in the documentation of the note.  I have verified the history and personally performed the physical  exam and medical decision making.  I agree with the assessment and plan of care as documented in the note.     Shona Lu MD FACOG

## 2023-08-14 NOTE — LETTER
August 14, 2023      Diya Lombardo  [address] [place] [address]        To Whom It May Concern:    Diya Lombardo  was seen on 8/14/23.  Ismael Lombardo will be late to work today due to his presence at today's visit.       Sincerely,        Shona Lu MD

## 2023-08-14 NOTE — PATIENT INSTRUCTIONS
If you have labs or imaging done, the results will automatically release in [employer] without an interpretation.  Your health care professional will review those results and send an interpretation with recommendations as soon as possible, but this may be 1-3 business days.    If you have any questions regarding your visit, please contact your care team.     [employer] Services: [phone]  [facility] CLINIC HOURS TELEPHONE NUMBER       MD Alie Rey - Certified Medical Assistant     Maty- LEAD RN  Malinda-NARENDRA Gill-NARENDRA Rodriguez-  Maria Elena-     Monday- [place]  8:00 a.m - 5:00 p.m    Tuesday- Surgery        Thursday- [place]  8:00 a.m - 5:00 p.m.    Friday- [place]  7:30 a.m - 4:00 p.m. [facility]  [address]  [place] [address]  [phone] Fax  Imaging Scheduling [phone]    [facility] Labor and Delivery  [address] [place] [address] Dr.  [place], [place] [address] [phone]    [facility]  [address] [place], [place] [address] [phone] Fax  Imaging Scheduling [phone]     Urgent Care locations:  [facility] Monday-Friday  10 am - 8 pm  Saturday and Sunday   9 am - 5 pm  Monday-Friday   10 am - 8 pm  Saturday and Sunday   9 am - 5 pm   [phone]     **Surgeries** Our Surgery Schedulers will contact you to schedule. If you do not receive a call within 3 business days, please call [phone].    If you need a medication refill, please contact your pharmacy. Please allow 3 business days for your refill to be completed.    As always, thank you for trusting us with your healthcare needs!

## 2023-08-16 LAB
BACTERIA UR CULT: ABNORMAL
BACTERIA UR CULT: ABNORMAL

## 2023-08-17 DIAGNOSIS — O23.41 GBS (GROUP B STREPTOCOCCUS) UTI COMPLICATING PREGNANCY, FIRST TRIMESTER: Primary | ICD-10-CM

## 2023-08-17 DIAGNOSIS — B95.1 GBS (GROUP B STREPTOCOCCUS) UTI COMPLICATING PREGNANCY, FIRST TRIMESTER: Primary | ICD-10-CM

## 2023-08-17 LAB
BKR LAB AP GYN ADEQUACY: NORMAL
BKR LAB AP GYN INTERPRETATION: NORMAL
BKR LAB AP HPV REFLEX: NORMAL
BKR LAB AP LMP: NORMAL
BKR LAB AP PREVIOUS ABNORMAL: NORMAL
PATH REPORT.COMMENTS IMP SPEC: NORMAL
PATH REPORT.COMMENTS IMP SPEC: NORMAL
PATH REPORT.RELEVANT HX SPEC: NORMAL

## 2023-08-17 RX ORDER — AMOXICILLIN 875 MG
875 TABLET ORAL 2 TIMES DAILY
Qty: 10 TABLET | Refills: 0 | Status: SHIPPED | OUTPATIENT
Start: 2023-08-17 | End: 2023-08-22

## 2023-08-21 LAB
HUMAN PAPILLOMA VIRUS 16 DNA: NEGATIVE
HUMAN PAPILLOMA VIRUS 18 DNA: NEGATIVE
HUMAN PAPILLOMA VIRUS FINAL DIAGNOSIS: NORMAL
HUMAN PAPILLOMA VIRUS OTHER HR: NEGATIVE

## 2023-09-01 ENCOUNTER — MYC MEDICAL ADVICE (OUTPATIENT)
Dept: OBGYN | Facility: CLINIC | Age: 33
End: 2023-09-01
Payer: COMMERCIAL

## 2023-09-05 ENCOUNTER — MYC MEDICAL ADVICE (OUTPATIENT)
Dept: OBGYN | Facility: CLINIC | Age: 33
End: 2023-09-05
Payer: COMMERCIAL

## 2023-09-06 NOTE — TELEPHONE ENCOUNTER
", 14w5d.    Pt ate salmon sushi last night.  She was told the salmon was fully cooked but now she is concerned that maybe it wasn't cooked.  She states it was \"cold smoked\".    Pt is concerned about listeria.  "

## 2023-09-08 NOTE — TELEPHONE ENCOUNTER
", 15w0d.    Pt ate salmon janethhi on  and is concerned that the salmon wasn't cooked, it was \"cold smoked\".  She is concerned about listeria.    Pt is concerned because she has noticed some body aches that developed over night.  She states her muscles \"feel weird\".    "

## 2023-09-12 PROBLEM — Z67.91 RH NEGATIVE STATE IN ANTEPARTUM PERIOD: Status: ACTIVE | Noted: 2023-09-12

## 2023-09-12 PROBLEM — O26.899 RH NEGATIVE STATE IN ANTEPARTUM PERIOD: Status: ACTIVE | Noted: 2023-09-12

## 2023-09-12 PROBLEM — B95.1 GBS (GROUP B STREPTOCOCCUS) UTI COMPLICATING PREGNANCY: Status: ACTIVE | Noted: 2023-09-12

## 2023-09-12 PROBLEM — O23.40 GBS (GROUP B STREPTOCOCCUS) UTI COMPLICATING PREGNANCY: Status: ACTIVE | Noted: 2023-09-12

## 2023-09-12 NOTE — PROGRESS NOTES
Presents for routine  appointment. History of CS for fetal distress, uncomplicated.  I recommended she establish care with a PCP for management of ashtma, but it does not look like this has been done yet.      No abnormal discharge, no leaking fluid, no contractions, no vaginal bleeding   ROS:   and GI negative.     Please see Prenatal Vitals and Notes Flowsheet for objective data.  TSH   Date Value Ref Range Status   2023 1.93 0.30 - 4.20 uIU/mL Final   2021 1.80 0.40 - 4.00 mU/L Final        A/P:  33 year old  at 15w6d        ICD-10-CM    1. History of  delivery, currently pregnant  O34.219 US OB > 14 Weeks      2. Rh negative state in antepartum period  O26.899     Z67.91       3. Group B Streptococcus urinary tract infection affecting pregnancy, antepartum  O23.40     B95.1           Genetic screening declined    Group B Strep positive urine, plan Group B Strep prophylaxis in labor.  No pcn allergy  Rh Negative - plan Rhogam at 28 weeks, sooner as needed.  is also RH neg, so may opt out.  Follow up in 4 week(s).      Shona Lu MD

## 2023-09-14 ENCOUNTER — PRENATAL OFFICE VISIT (OUTPATIENT)
Dept: OBGYN | Facility: OTHER | Age: 33
End: 2023-09-14
Payer: COMMERCIAL

## 2023-09-14 VITALS — SYSTOLIC BLOOD PRESSURE: 123 MMHG | WEIGHT: 163.3 LBS | DIASTOLIC BLOOD PRESSURE: 83 MMHG | BODY MASS INDEX: 25.58 KG/M2

## 2023-09-14 DIAGNOSIS — B95.1 GROUP B STREPTOCOCCUS URINARY TRACT INFECTION AFFECTING PREGNANCY, ANTEPARTUM: ICD-10-CM

## 2023-09-14 DIAGNOSIS — Z67.91 RH NEGATIVE STATE IN ANTEPARTUM PERIOD: ICD-10-CM

## 2023-09-14 DIAGNOSIS — O34.219 HISTORY OF CESAREAN DELIVERY, CURRENTLY PREGNANT: Primary | ICD-10-CM

## 2023-09-14 DIAGNOSIS — O23.40 GROUP B STREPTOCOCCUS URINARY TRACT INFECTION AFFECTING PREGNANCY, ANTEPARTUM: ICD-10-CM

## 2023-09-14 DIAGNOSIS — O26.899 RH NEGATIVE STATE IN ANTEPARTUM PERIOD: ICD-10-CM

## 2023-09-14 PROCEDURE — 99207 PR PRENATAL VISIT: CPT | Performed by: OBSTETRICS & GYNECOLOGY

## 2023-09-14 NOTE — PATIENT INSTRUCTIONS
If you have labs or imaging done, the results will automatically release in [employer] without an interpretation.  Your health care professional will review those results and send an interpretation with recommendations as soon as possible, but this may be 1-3 business days.    If you have any questions regarding your visit, please contact your care team.     [employer] Access Services: [phone]  [facility] CLINIC HOURS TELEPHONE NUMBER       MD Alie Rey - Certified Medical Assistant     Maty- LEAD RN  Malinda-NARENDRA Gill-NARENDRA Rodriguez-  Maria Elena-     Monday- [place]  8:00 a.m - 5:00 p.m    Tuesday- Surgery        Thursday- [place]  8:00 a.m - 5:00 p.m.    Friday- [place]  7:30 a.m - 4:00 p.m. [facility]  [address]  [place] [address]  [phone] Fax  Imaging Scheduling [phone]    [facility] Labor and Delivery  [address] [place] [address] Dr.  [place], [place] [address] [phone]    [facility]  [address] [place], [place] [address] [phone] Fax  Imaging Scheduling [phone]     Urgent Care locations:  [facility] Monday-Friday  10 am - 8 pm  Saturday and Sunday   9 am - 5 pm  Monday-Friday   10 am - 8 pm  Saturday and Sunday   9 am - 5 pm   [phone]     **Surgeries** Our Surgery Schedulers will contact you to schedule. If you do not receive a call within 3 business days, please call [phone].    If you need a medication refill, please contact your pharmacy. Please allow 3 business days for your refill to be completed.    As always, thank you for trusting us with your healthcare needs!

## 2023-09-14 NOTE — LETTER
September 14, 2023      Diya Garciaen  [address] [place] [address]        To Whom It May Concern:    Diya Lombardo  was seen on 9/14/23.  Please excuse her , Ismael, as he was present at her appointment today.      Sincerely,        Shona Lu MD

## 2023-10-06 ENCOUNTER — ANCILLARY PROCEDURE (OUTPATIENT)
Dept: ULTRASOUND IMAGING | Facility: OTHER | Age: 33
End: 2023-10-06
Attending: OBSTETRICS & GYNECOLOGY
Payer: COMMERCIAL

## 2023-10-06 DIAGNOSIS — O34.219 HISTORY OF CESAREAN DELIVERY, CURRENTLY PREGNANT: ICD-10-CM

## 2023-10-06 PROCEDURE — 76805 OB US >/= 14 WKS SNGL FETUS: CPT | Mod: TC | Performed by: RADIOLOGY

## 2023-10-09 ENCOUNTER — PRENATAL OFFICE VISIT (OUTPATIENT)
Dept: OBGYN | Facility: CLINIC | Age: 33
End: 2023-10-09
Payer: COMMERCIAL

## 2023-10-09 VITALS
WEIGHT: 168.4 LBS | SYSTOLIC BLOOD PRESSURE: 135 MMHG | BODY MASS INDEX: 26.38 KG/M2 | HEART RATE: 100 BPM | DIASTOLIC BLOOD PRESSURE: 81 MMHG

## 2023-10-09 DIAGNOSIS — O34.219 HISTORY OF CESAREAN DELIVERY, CURRENTLY PREGNANT: Primary | ICD-10-CM

## 2023-10-09 DIAGNOSIS — Z28.21 INFLUENZA VACCINATION DECLINED: ICD-10-CM

## 2023-10-09 PROCEDURE — 99207 PR PRENATAL VISIT: CPT | Performed by: OBSTETRICS & GYNECOLOGY

## 2023-10-09 NOTE — PATIENT INSTRUCTIONS
If you have labs or imaging done, the results will automatically release in [employer] without an interpretation.  Your health care professional will review those results and send an interpretation with recommendations as soon as possible, but this may be 1-3 business days.    If you have any questions regarding your visit, please contact your care team.     [email or web address] Access Services: [phone]  [facility] CLINIC HOURS TELEPHONE NUMBER       MD Alie Rey - Certified Medical Assistant     Maty- LEAD RN  Malinda-NARENDRA Gill-NARENDRA Rodriguez-  Maria Elena-     Monday- [place]  8:00 a.m - 5:00 p.m    Tuesday- Surgery        Thursday- [place]  8:00 a.m - 5:00 p.m.    Friday- [place]  7:30 a.m - 4:00 p.m. [facility]  [address]  [place] [address]  [phone] Fax  Imaging Scheduling [phone]    [facility] Labor and Delivery  [address] [place] [address] Dr.  [place], [place] [address] [phone]    [facility]  [address] [place], [place] [address] [phone] Fax  Imaging Scheduling [phone]     Urgent Care locations:  [facility] Monday-Friday  10 am - 8 pm  Saturday and Sunday   9 am - 5 pm  Monday-Friday   10 am - 8 pm  Saturday and Sunday   9 am - 5 pm   [phone]     **Surgeries** Our Surgery Schedulers will contact you to schedule. If you do not receive a call within 3 business days, please call [phone].    If you need a medication refill, please contact your pharmacy. Please allow 3 business days for your refill to be completed.    As always, thank you for trusting us with your healthcare needs!

## 2023-10-09 NOTE — LETTER
October 9, 2023      Diya Lombardo  [address] [address]        To Whom It May Concern:    Diyajuliana Lombardo  was seen on 10/9/23.  Ismael was present during this visit. Please excuse him from work.        Sincerely,        Shona Lu MD

## 2023-10-09 NOTE — PROGRESS NOTES
Presents for routine  appointment.     No complaints.    No leaking fluid, no contractions, no vaginal bleeding   ROS:   and GI  negative.     Please see Prenatal Vitals and Notes Flowsheet for objective data.  Lab Results   Component Value Date    ABO O 2021    RH Neg 2021       A/P:  33 year old  at 19w3d       ICD-10-CM    1. History of  delivery, currently pregnant  O34.219 Glucose tolerance, gest screen, 1 hour     OB hemoglobin     ABO/Rh type and screen     Treponema Abs w Reflex to RPR and Titer      2. Influenza vaccination declined  Z28.21           Discussed ultrasound results - normal  Rh Negative - plan Rhogam at 28 weeks, sooner as needed.  is also RH neg, so may opt out.   GCT, hgb greater or equal to 24 weeks   Follow up in 4 weeks.      Shona Lu MD

## 2023-11-09 LAB
ABO/RH(D): NORMAL
ANTIBODY SCREEN: NEGATIVE
SPECIMEN EXPIRATION DATE: NORMAL

## 2023-11-09 NOTE — PATIENT INSTRUCTIONS
SIGNS OF  LABOR    Labor is  if it happens more than three weeks before your due date.    It can be hard to know if you are in labor, since the symptoms can be like the normal feelings of pregnancy.  Often, the only difference is the symptoms increase or they don't go away.     Signs of  labor can include:    Change in your vaginal discharge:  You will have more vaginal discharge when you are pregnant and it should be creamy white.  Call the clinic right away if your discharge has a foul odor, pink or bloody,  or if it becomes watery or is more than is normal for you during your pregnancy.    More than 5-6 contractions or tightenings per hour.  Contractions can feel like period cramps or bowel (gas or diarrhea) pain.  You will feel it in the lower part of your abdomen, in your back or as a pressure feeling in your bottom.  It is often regular, coming for 30 seconds or a minute and then going away, only to come back 5 or 10 minutes later. Some contractions are normal during pregnancy, but if you are feeling more than 5-6 in one hour, empty your bladder, then drink 16-24 ounces of water, eat a snack and lay down on your left side. Put your hand on your abdomen to count the contractions.  If after one hour of resting you have still had 5-6 contractions call your clinic.                If you have labs or imaging done, the results will automatically release in [employer] without an interpretation.  Your health care professional will review those results and send an interpretation with recommendations as soon as possible, but this may be 1-3 business days.    If you have any questions regarding your visit, please contact your care team.     "[employer] Access Services: [phone]  [facility] CLINIC HOURS TELEPHONE NUMBER       MD Alie Rey - Certified Medical Assistant     Shari Petty-NARENDRA Rodriguez-  Maria Elena-     [place]  8:00 a.m - 5:00 p.m    Tuesday- Surgery        Thursday- [place]  8:00 a.m - 5:00 p.m.    Friday- [place]  7:30 a.m - 4:00 p.m. [facility]  [address]  [place] [address]  [phone] Fax  [phone] Phone  Imaging Scheduling [phone]    [facility] Labor and Delivery  [address] [place] [address]  [phone]    [facility]  [address] [place] [address]  [phone] Phone  [phone] Fax  Imaging Scheduling [phone]     Urgent Care locations:  [facility] Monday-Friday  10 am - 8 pm  Saturday and Sunday   9 am - 5 pm  Monday-Friday   10 am - 8 pm  Saturday and Sunday   9 am - 5 pm   [phone]     **Surgeries** Our Surgery Schedulers will contact you to schedule. If you do not receive a call within 3 business days, please call [phone].    If you need a medication refill, please contact your pharmacy. Please allow 3 business days for your refill to be completed.    As always, thank you for trusting us with your healthcare needs!

## 2023-11-10 ENCOUNTER — PRENATAL OFFICE VISIT (OUTPATIENT)
Dept: OBGYN | Facility: CLINIC | Age: 33
End: 2023-11-10
Payer: COMMERCIAL

## 2023-11-10 VITALS — SYSTOLIC BLOOD PRESSURE: 128 MMHG | DIASTOLIC BLOOD PRESSURE: 85 MMHG | BODY MASS INDEX: 27.52 KG/M2 | WEIGHT: 175.7 LBS

## 2023-11-10 DIAGNOSIS — O26.899 RH NEGATIVE STATE IN ANTEPARTUM PERIOD: ICD-10-CM

## 2023-11-10 DIAGNOSIS — O34.219 HISTORY OF CESAREAN DELIVERY, CURRENTLY PREGNANT: Primary | ICD-10-CM

## 2023-11-10 DIAGNOSIS — Z67.91 RH NEGATIVE STATE IN ANTEPARTUM PERIOD: ICD-10-CM

## 2023-11-10 DIAGNOSIS — O34.219 PATIENT DESIRES VAGINAL BIRTH AFTER CESAREAN SECTION (VBAC): ICD-10-CM

## 2023-11-10 LAB
GLUCOSE 1H P 50 G GLC PO SERPL-MCNC: 119 MG/DL (ref 70–129)
HGB BLD-MCNC: 11.3 G/DL (ref 11.7–15.7)
T PALLIDUM AB SER QL: NONREACTIVE

## 2023-11-10 PROCEDURE — 86900 BLOOD TYPING SEROLOGIC ABO: CPT | Performed by: OBSTETRICS & GYNECOLOGY

## 2023-11-10 PROCEDURE — 36415 COLL VENOUS BLD VENIPUNCTURE: CPT | Performed by: OBSTETRICS & GYNECOLOGY

## 2023-11-10 PROCEDURE — 86780 TREPONEMA PALLIDUM: CPT | Performed by: OBSTETRICS & GYNECOLOGY

## 2023-11-10 PROCEDURE — 86901 BLOOD TYPING SEROLOGIC RH(D): CPT | Performed by: OBSTETRICS & GYNECOLOGY

## 2023-11-10 PROCEDURE — 86850 RBC ANTIBODY SCREEN: CPT | Performed by: OBSTETRICS & GYNECOLOGY

## 2023-11-10 PROCEDURE — 99207 PR PRENATAL VISIT: CPT | Performed by: OBSTETRICS & GYNECOLOGY

## 2023-11-10 PROCEDURE — 82950 GLUCOSE TEST: CPT | Performed by: OBSTETRICS & GYNECOLOGY

## 2023-11-10 NOTE — PROGRESS NOTES
Presents for routine  appointment.     No complaints.    No leaking fluid , no contractions , no vaginal bleeding    ROS:   and GI  negative.     A/P:  33 year old  at 24w0d       ICD-10-CM    1. History of  delivery, currently pregnant  O34.219       2. Rh negative state in antepartum period  O26.899     Z67.91       3. Patient desires vaginal birth after  section ()  O34.219           1 hr glucola today.  She does have access to MyChart.  She is Rh Neg   TDAP and Rhogam  next visit.    Discussed signs and symptoms of  labor   Asthma - stable  Desires  - plan CS in case we need it the first week of March  Follow up in 4 weeks.      Shona Lu MD

## 2023-11-14 ENCOUNTER — MYC MEDICAL ADVICE (OUTPATIENT)
Dept: OBGYN | Facility: CLINIC | Age: 33
End: 2023-11-14
Payer: COMMERCIAL

## 2023-12-05 NOTE — PROGRESS NOTES
Presents for routine  appointment.    No abnormal discharge, no leaking fluid, no contractions, no vaginal bleeding    ROS:   and GI negative.     Please see Prenatal Vitals and Notes Flowsheet for objective data.  Hemoglobin   Date Value Ref Range Status   11/10/2023 11.3 (L) 11.7 - 15.7 g/dL Final   2021 14.3 11.7 - 15.7 g/dL Final       A/P:  33 year old  at 27w6d        ICD-10-CM    1. History of  delivery, currently pregnant  O34.219           GCT Normal  TDAP today.    Rhogam: given  Discussed kick counts   Desires  - plan CS in case we need it the first week of March.  BRENT 3/1, Friday.    Supportive care for back pain discussed.  She will let me know if she needs a referral for PT  Follow up in 2 weeks.      Shona Lu MD

## 2023-12-05 NOTE — PATIENT INSTRUCTIONS
If you have labs or imaging done, the results will automatically release in [employer] without an interpretation.  Your health care professional will review those results and send an interpretation with recommendations as soon as possible, but this may be 1-3 business days.    If you have any questions regarding your visit, please contact your care team.     [employer] Access Services: [phone]  [facility] CLINIC HOURS TELEPHONE NUMBER       MD Alie Rey - Certified Medical Assistant     Maty- LEAD RN  Malinda-NARENDRA Gill-NARENDRA Rodriguez-  Maria Elena-     Monday- [place]  8:00 a.m - 5:00 p.m    Tuesday- Surgery        Thursday- [place]  8:00 a.m - 5:00 p.m.    Friday- [place]  7:30 a.m - 4:00 p.m. [facility]  [address]  [place], [place] [address] [phone] Fax  [phone] Phone  Imaging Scheduling [phone]    [facility] Labor and Delivery  [address] [place] [address] Dr.  [place], [place] [address] [phone]    [facility]  [address] [place], [place] [address] [phone] Phone  [phone] Fax  Imaging Scheduling [phone]     Urgent Care locations:  [facility] Monday-Friday  10 am - 8 pm  Saturday and Sunday   9 am - 5 pm  Monday-Friday   10 am - 8 pm  Saturday and Sunday   9 am - 5 pm   [phone]     **Surgeries** Our Surgery Schedulers will contact you to schedule. If you do not receive a call within 3 business days, please call [phone].    If you need a medication refill, please contact your pharmacy. Please allow 3 business days for your refill to be completed.    As always, thank you for trusting us with your healthcare needs!

## 2023-12-07 ENCOUNTER — PRENATAL OFFICE VISIT (OUTPATIENT)
Dept: OBGYN | Facility: OTHER | Age: 33
End: 2023-12-07
Payer: COMMERCIAL

## 2023-12-07 ENCOUNTER — TELEPHONE (OUTPATIENT)
Dept: OBGYN | Facility: OTHER | Age: 33
End: 2023-12-07

## 2023-12-07 VITALS
HEART RATE: 95 BPM | OXYGEN SATURATION: 99 % | BODY MASS INDEX: 27.88 KG/M2 | DIASTOLIC BLOOD PRESSURE: 73 MMHG | WEIGHT: 178 LBS | SYSTOLIC BLOOD PRESSURE: 111 MMHG

## 2023-12-07 DIAGNOSIS — Z23 NEED FOR TDAP VACCINATION: ICD-10-CM

## 2023-12-07 DIAGNOSIS — O34.219 HISTORY OF CESAREAN DELIVERY, CURRENTLY PREGNANT: Primary | ICD-10-CM

## 2023-12-07 DIAGNOSIS — Z29.13 NEED FOR RHOGAM DUE TO RH NEGATIVE MOTHER: ICD-10-CM

## 2023-12-07 PROCEDURE — 90715 TDAP VACCINE 7 YRS/> IM: CPT | Performed by: OBSTETRICS & GYNECOLOGY

## 2023-12-07 PROCEDURE — 96372 THER/PROPH/DIAG INJ SC/IM: CPT | Performed by: OBSTETRICS & GYNECOLOGY

## 2023-12-07 PROCEDURE — 90471 IMMUNIZATION ADMIN: CPT | Performed by: OBSTETRICS & GYNECOLOGY

## 2023-12-07 PROCEDURE — 99207 PR PRENATAL VISIT: CPT | Performed by: OBSTETRICS & GYNECOLOGY

## 2023-12-07 NOTE — TELEPHONE ENCOUNTER
[facility] SURGERY PLANNING/SCHEDULING WORKSHEET                                                     Diya Lombardo                :  1990  MRN:  [id number]  Home Phone [phone]   Work Phone Not on file.   Mobile [phone]         Surgeon: Shona Lu MD    DIAGNOSIS:   History of     SURGICAL PROCEDURE:  repeat      Surgery Location:  [facility]  Patient Surgery Class:  Admission with overnight stay of 3 days  Length of Procedure:  60 minutes  Type of anesthesia:  Spinal    Multi-surgeon case: NA  OR Assistant needed:   Yes  Vendor needed: No  Positioning:  Supine  Laterality:  NA  Date requested:  39 - 40 weeks for her CS.  Patient prefers , which is ok with me    Special Equipment: None  Special Instructions for patient:  Per the [facility] Pre-Admission Nurse when they call the patient prior to the surgery date.   Precautions:  NONE  :  NOT NEEDED    Sterilization consent:  Not applicable to procedure being performed.    Preop: Pre-op options: Surgeon  Pre-surgery consult needed:  Not applicable.  Postop evaluation needed:  6 weeks    ALLERGIES:   Allergies   Allergen Reactions    Beeswax Swelling    Benzoin       BMI:There is no height or weight on file to calculate BMI.      The proposed surgical procedure is considered INTERMEDIATE risk.      Shona Lu MD    2023

## 2023-12-07 NOTE — NURSING NOTE
Prior to immunization administration, verified patients identity using patient s name and date of birth. Please see Immunization Activity for additional information.     Screening Questionnaire for Adult Immunization    Are you sick today?   No   Do you have allergies to medications, food, a vaccine component or latex?   No   Have you ever had a serious reaction after receiving a vaccination?   No   Do you have a long-term health problem with heart, lung, kidney, or metabolic disease (e.g., diabetes), asthma, a blood disorder, no spleen, complement component deficiency, a cochlear implant, or a spinal fluid leak?  Are you on long-term aspirin therapy?   No   Do you have cancer, leukemia, HIV/AIDS, or any other immune system problem?   No   Do you have a parent, brother, or sister with an immune system problem?   No   In the past 3 months, have you taken medications that affect  your immune system, such as prednisone, other steroids, or anticancer drugs; drugs for the treatment of rheumatoid arthritis, Crohn s disease, or psoriasis; or have you had radiation treatments?   No   Have you had a seizure, or a brain or other nervous system problem?   No   During the past year, have you received a transfusion of blood or blood    products, or been given immune (gamma) globulin or antiviral drug?   No   For women: Are you pregnant or is there a chance you could become       pregnant during the next month?   Yes   Have you received any vaccinations in the past 4 weeks?   No     Immunization questionnaire Established pregnancy patient.      Patient instructed to remain in clinic for 15 minutes afterwards, and to report any adverse reactions.     Screening performed by Baudilio Mccabe CMA on 12/7/2023 at 8:44 AM.

## 2023-12-07 NOTE — TELEPHONE ENCOUNTER
[facility] SURGERY PLANNING/SCHEDULING WORKSHEET                                                     Diya Lombardo                :  1990  MRN:  [id number]  Home Phone [phone]   Work Phone Not on file.   Mobile [phone]         Surgeon: Shona Lu MD     DIAGNOSIS:   History of      SURGICAL PROCEDURE:  repeat       Surgery Location:  [facility]  Patient Surgery Class:  Admission with overnight stay of 3 days  Length of Procedure:  60 minutes  Type of anesthesia:  Spinal     Multi-surgeon case: NA  OR Assistant needed:   Yes  Vendor needed: No  Positioning:  Supine  Laterality:  NA  Date requested:  39 - 40 weeks for her CS.  Patient prefers , which is ok with me     Special Equipment: None  Special Instructions for patient:  Per the [facility] Pre-Admission Nurse when they call the patient prior to the surgery date.   Precautions:  NONE  :  NOT NEEDED     Sterilization consent:  Not applicable to procedure being performed.     Preop: Pre-op options: Surgeon  Pre-surgery consult needed:  Not applicable.  Postop evaluation needed:  6 weeks     ALLERGIES:        Allergies   Allergen Reactions    Beeswax Swelling    Benzoin        BMI:There is no height or weight on file to calculate BMI.       The proposed surgical procedure is considered INTERMEDIATE risk.        Shona Lu MD    2023  SURGERY SCHEDULING AND PRECERTIFICATION    Medical Record Number: [id number]  Diya Lombardo  YOB: 1990   Phone: [phone] (home)   Primary Provider: Willard Arriola    Reason for Admit:  ICD-10 CODE:  O34.219    Surgeon: Shona Lu MD  Surgical Procedure: repeat     Date of Surgery  Time of Surgery 7:30am  Surgery to be performed at:  [facility]  Status: Inpatient- Length of stay:  3 days.  Type of Anesthesia Anticipated: Spinal     Sterilization consent:  Not applicable to procedure being  performed.    Pre-Op: On 02/15 with Dr Lu at [facility]  COVID testing:  Per Provider's discretion Covid testing is not indicated.     Post-Op: patient to schedule 6 weeks with Dr Lu (Clinic schedules not out yet)    Pre-certification routed to Financial [employer]:  Yes    Surgery packet mailed to patient's home address: Yes  Patient instructed NPO 12 hours prior to surgery, arrive 1 hour 45 minutes prior to surgery, must have a .  Patient understood and agrees to the plan.      Requestor:  Gloria Demarco     Location:  [place] [phone]

## 2023-12-21 NOTE — PROGRESS NOTES
Presents for routine  appointment.     No  leaking fluid, no contractions, no vaginal bleeding   ROS:   and GI negative.     Please see Prenatal Vitals and Notes Flowsheet for objective data.  Hemoglobin   Date Value Ref Range Status   11/10/2023 11.3 (L) 11.7 - 15.7 g/dL Final   2021 14.3 11.7 - 15.7 g/dL Final       A/P:  33 year old  at 30w0d        ICD-10-CM    1. Patient desires vaginal birth after  section ()  O34.219           TDAP and Rhogam given last visit.     Discussed ruby nguyen   Desires  - plan CS in the event she does not go into labor. CS 23 at 730am   Follow up in 2 weeks.      Shona Lu MD

## 2023-12-22 ENCOUNTER — PRENATAL OFFICE VISIT (OUTPATIENT)
Dept: OBGYN | Facility: CLINIC | Age: 33
End: 2023-12-22
Payer: COMMERCIAL

## 2023-12-22 VITALS — BODY MASS INDEX: 28.08 KG/M2 | SYSTOLIC BLOOD PRESSURE: 105 MMHG | WEIGHT: 179.3 LBS | DIASTOLIC BLOOD PRESSURE: 71 MMHG

## 2023-12-22 DIAGNOSIS — O34.219 PATIENT DESIRES VAGINAL BIRTH AFTER CESAREAN SECTION (VBAC): Primary | ICD-10-CM

## 2023-12-22 PROBLEM — Z23 NEED FOR TDAP VACCINATION: Status: RESOLVED | Noted: 2023-07-13 | Resolved: 2023-12-22

## 2023-12-22 PROCEDURE — 99207 PR PRENATAL VISIT: CPT | Performed by: OBSTETRICS & GYNECOLOGY

## 2023-12-22 RX ORDER — FLUTICASONE PROPIONATE 50 MCG
2 SPRAY, SUSPENSION (ML) NASAL
COMMUNITY
Start: 2023-12-12

## 2023-12-22 RX ORDER — CETIRIZINE HYDROCHLORIDE 10 MG/1
10 TABLET ORAL DAILY
COMMUNITY
Start: 2023-12-12

## 2023-12-22 RX ORDER — LEVOCETIRIZINE DIHYDROCHLORIDE 5 MG/1
1 TABLET, FILM COATED ORAL DAILY
COMMUNITY
Start: 2023-12-14

## 2024-01-05 ENCOUNTER — PRENATAL OFFICE VISIT (OUTPATIENT)
Dept: OBGYN | Facility: CLINIC | Age: 34
End: 2024-01-05
Payer: COMMERCIAL

## 2024-01-05 VITALS — SYSTOLIC BLOOD PRESSURE: 127 MMHG | WEIGHT: 183.1 LBS | BODY MASS INDEX: 28.68 KG/M2 | DIASTOLIC BLOOD PRESSURE: 81 MMHG

## 2024-01-05 DIAGNOSIS — O34.219 PATIENT DESIRES VAGINAL BIRTH AFTER CESAREAN SECTION (VBAC): Primary | ICD-10-CM

## 2024-01-05 PROCEDURE — 99207 PR PRENATAL VISIT: CPT | Performed by: OBSTETRICS & GYNECOLOGY

## 2024-01-05 NOTE — PROGRESS NOTES
Presents for routine  appointment.    Hip pain, right sided.  Keeping her up at night. Using heating pad and tylenol.   No leaking fluid, no contractions, no vaginal bleeding   ROS:   and GI negative.     Please see Prenatal Vitals and Notes Flowsheet for objective data.    A/P:  33 year old  at 32w0d       ICD-10-CM    1. Patient desires vaginal birth after  section ()  O34.219           Tdap given at previous visit.  She will think about the RSV vaccine, but leaning against it at this time.   Discussed plans for contraception - discuss at PP visit  Plan to add benadryl at night.  Offered physical therapy and she is not interested at this time.   Asthma stable  Desires  - plan CS in the event she does not go into labor. CS 23 at 730am.  Discussed r/b/a - The decision for a trial of labor versus an elective repeat  section is ultimately hers and could be changed at any time.  Risk of uterine rupture - should uterine rupture occur, there may not be sufficient time to operate and prevent the death of or permanent injury to her fetus or to the patient.  She is aware that maternal morbidity and mortality are higher with repeat  section than with trial of labor.  Likewise, she is aware that a repeat  performed after a trial of labor carries greater maternal risks than an elective repeat  without trial of labor.  Risk of hemorrhage, that may or may not require blood transfusion or hysterectomy.   All questions were answered to her apparent satisfaction.   Follow up in 2 weeks.      Shona Lu MD

## 2024-01-09 ENCOUNTER — MYC MEDICAL ADVICE (OUTPATIENT)
Dept: OBGYN | Facility: CLINIC | Age: 34
End: 2024-01-09
Payer: COMMERCIAL

## 2024-01-09 NOTE — LETTER
To Whom it May Concern,         Diya Lombardo was seen and treated at our clinic.  Patient is pregnant and planning on a c section on 2/28/2024.  Her , Ismael, needs to be present.   Feel free to contact me with any questions or concerns.      Selina Hester, DO

## 2024-01-09 NOTE — TELEPHONE ENCOUNTER
Pt is scheduled for a  with Dr. Lu on 24.    She is requesting a letter stating the date of her  and that her  Ismael will be present.  Pt's  needs this to submit for FMLA.    Routing to Dr. Lu to see if a note like pt is requesting is okay to write.    Malinda Amos RN

## 2024-01-12 NOTE — TELEPHONE ENCOUNTER
*Pa form sent to provider team  PB DOS: 24 IP 3 days   Type of Procedure: repeat   CPT Codes: 35318  ICD10 Codes: O34.219  Surgeon/Ordering provider: Shona Lu MD  Pre-cert/Authorization completed:   Payer: [employer]   Spoke to   Ref. # / Auth #   Valid Dates:     Please advise the patient to contact their insurance for coverage and benefits. Prior authorization is not a guarantee of payment. Payment is based on the patient's benefit plan documents such as copays, deductibles and out of pocket minimums.

## 2024-01-15 ENCOUNTER — PRENATAL OFFICE VISIT (OUTPATIENT)
Dept: OBGYN | Facility: CLINIC | Age: 34
End: 2024-01-15
Payer: COMMERCIAL

## 2024-01-15 VITALS — BODY MASS INDEX: 28.83 KG/M2 | WEIGHT: 184.1 LBS | SYSTOLIC BLOOD PRESSURE: 130 MMHG | DIASTOLIC BLOOD PRESSURE: 85 MMHG

## 2024-01-15 DIAGNOSIS — O34.219 PATIENT DESIRES VAGINAL BIRTH AFTER CESAREAN SECTION (VBAC): Primary | ICD-10-CM

## 2024-01-15 DIAGNOSIS — O34.219 HISTORY OF CESAREAN DELIVERY, CURRENTLY PREGNANT: ICD-10-CM

## 2024-01-15 PROCEDURE — 99207 PR PRENATAL VISIT: CPT | Performed by: OBSTETRICS & GYNECOLOGY

## 2024-01-15 RX ORDER — BREAST PUMP
1 EACH MISCELLANEOUS PRN
Qty: 1 EACH | Refills: 0 | Status: SHIPPED | OUTPATIENT
Start: 2024-01-15

## 2024-01-15 RX ORDER — BREAST PUMP
1 EACH MISCELLANEOUS PRN
Qty: 1 EACH | Refills: 0 | Status: SHIPPED | OUTPATIENT
Start: 2024-01-15 | End: 2024-01-15

## 2024-01-15 NOTE — PROGRESS NOTES
Presents for routine  appointment.    No  leaking fluid, no contractions, no vaginal bleeding   ROS:   and GI negative.     Please see Prenatal Vitals and Notes Flowsheet for objective data.    A/P:  33 year old  at 33w3d       ICD-10-CM    1. Patient desires vaginal birth after  section ()  O34.219       2. History of  delivery, currently pregnant  O34.219           Tdap given at previous visit.  Rsv declined  Desires  - plan CS in the event she does not go into labor. CS 23 at 730am.   Follow up in 2 weeks.      Shona Lu MD

## 2024-01-15 NOTE — TELEPHONE ENCOUNTER
PB DOS: 24 IP 3 days   Type of Procedure: repeat   CPT Codes: 79978  ICD10 Codes: O34.219  Surgeon/Ordering provider: Shona Lu MD   Pre-cert/Authorization completed: no PA required per [facility] fax received   Payer: [facility]   Spoke to   Ref. # / Auth #   Valid Dates:     Please advise the patient to contact their insurance for coverage and benefits. Prior authorization is not a guarantee of payment. Payment is based on the patient's benefit plan documents at the time of service

## 2024-01-15 NOTE — PATIENT INSTRUCTIONS
If you have labs or imaging done, the results will automatically release in [employer] without an interpretation.  Your health care professional will review those results and send an interpretation with recommendations as soon as possible, but this may be 1-3 business days.    If you have any questions regarding your visit, please contact your care team.     [employer] Access Services: [phone]  [facility] CLINIC HOURS TELEPHONE NUMBER       MD Alie Rey - Certified Medical Assistant     Maty- LEAD RN  Malinda-NARENDRA Gill-NARENDRA Rodriguez-  Maria Elena-     Monday- [place]  8:00 a.m - 5:00 p.m    Tuesday- Surgery        Thursday- [place]  8:00 a.m - 5:00 p.m.    Friday- [place]  7:30 a.m - 4:00 p.m. [facility]  [address]  [place], [place] [address] [phone] Fax  [phone] Phone  Imaging Scheduling [phone]    [facility] Labor and Delivery  [address] [place] [address] Dr.  [place], [place] [address] [phone]    [facility]  [address] [place], [place] [address] [phone] Phone  [phone] Fax  Imaging Scheduling [phone]     Urgent Care locations:  [facility] Monday-Friday  10 am - 8 pm  Saturday and Sunday   9 am - 5 pm  Monday-Friday   10 am - 8 pm  Saturday and Sunday   9 am - 5 pm   [phone]     **Surgeries** Our Surgery Schedulers will contact you to schedule. If you do not receive a call within 3 business days, please call [phone].    If you need a medication refill, please contact your pharmacy. Please allow 3 business days for your refill to be completed.    As always, thank you for trusting us with your healthcare needs!

## 2024-01-15 NOTE — LETTER
January 15, 2024            To Whom It May Concern:       Diya Lombardo was seen and treated at our clinic.  Patient is pregnant and planning on a c section on 2/28/2024.  Her , Ismael, needs to be present.   Feel free to contact me with any questions or concerns.        Sincerely,        Shona Lu MD

## 2024-01-25 NOTE — PATIENT INSTRUCTIONS
If you have labs or imaging done, the results will automatically release in [facility] without an interpretation.  Your health care professional will review those results and send an interpretation with recommendations as soon as possible, but this may be 1-3 business days.    If you have any questions regarding your visit, please contact your care team.     [employer]: [phone]  [facility] CLINIC HOURS TELEPHONE NUMBER       MD Alie Rey - Certified Medical Assistant     Maty- LEAD RN  Malinda-NARENDRA Gill-NARENDRA Rodriguez-  Maria Elena-     Monday- [place]  8:00 a.m - 5:00 p.m    Tuesday- Surgery        Thursday- [place]  8:00 a.m - 5:00 p.m.    Friday- [place]  7:30 a.m - 4:00 p.m. [facility]  [address]  [place], [place] [address] [phone] Fax  [phone] Phone  Imaging Scheduling [phone]    [facility]  [address] [place] [address] Dr.  [place], [place] [address] [phone]    [facility]  [address] [place], [place] [address] [phone] Phone  [phone] Fax  Imaging Scheduling [phone]     Urgent Care locations:  [facility] Monday-Friday  10 am - 8 pm  Saturday and Sunday   9 am - 5 pm  Monday-Friday   10 am - 8 pm  Saturday and Sunday   9 am - 5 pm   [phone]     **Surgeries** Our Surgery Schedulers will contact you to schedule. If you do not receive a call within 3 business days, please call [phone].    If you need a medication refill, please contact your pharmacy. Please allow 3 business days for your refill to be completed.    As always, thank you for trusting us with your healthcare needs!

## 2024-02-01 ENCOUNTER — PRENATAL OFFICE VISIT (OUTPATIENT)
Dept: OBGYN | Facility: OTHER | Age: 34
End: 2024-02-01
Payer: COMMERCIAL

## 2024-02-01 VITALS — WEIGHT: 185.9 LBS | SYSTOLIC BLOOD PRESSURE: 121 MMHG | BODY MASS INDEX: 29.12 KG/M2 | DIASTOLIC BLOOD PRESSURE: 81 MMHG

## 2024-02-01 DIAGNOSIS — O23.40 GROUP B STREPTOCOCCUS URINARY TRACT INFECTION AFFECTING PREGNANCY, ANTEPARTUM: ICD-10-CM

## 2024-02-01 DIAGNOSIS — O34.219 PATIENT DESIRES VAGINAL BIRTH AFTER CESAREAN SECTION (VBAC): Primary | ICD-10-CM

## 2024-02-01 DIAGNOSIS — B95.1 GROUP B STREPTOCOCCUS URINARY TRACT INFECTION AFFECTING PREGNANCY, ANTEPARTUM: ICD-10-CM

## 2024-02-01 PROCEDURE — 99207 PR PRENATAL VISIT: CPT | Performed by: OBSTETRICS & GYNECOLOGY

## 2024-02-01 NOTE — PROGRESS NOTES
Presents for routine  appointment.     No complaints.    No leaking fluid , no contractions , no vaginal bleeding    ROS:   and GI  negative.     Please see Prenatal Vitals and Notes Flowsheet for objective data.    A/P:  33 year old  at 35w6d       ICD-10-CM    1. Patient desires vaginal birth after  section ()  O34.219       2. Group B Streptococcus urinary tract infection affecting pregnancy, antepartum  O23.40     B95.1           Reportable signs and symptoms discussed  Desires  - plan CS in the event she does not go into labor. CS 23 at 730am.   The decision to proceed with repeat  versus trial of labor will depend on her clinical circumstances if she presents in labor prior to her scheduled    Follow up in 1 week      Shona Lu MD

## 2024-02-08 ENCOUNTER — PRENATAL OFFICE VISIT (OUTPATIENT)
Dept: OBGYN | Facility: OTHER | Age: 34
End: 2024-02-08
Payer: COMMERCIAL

## 2024-02-08 VITALS — BODY MASS INDEX: 29.23 KG/M2 | DIASTOLIC BLOOD PRESSURE: 81 MMHG | WEIGHT: 186.6 LBS | SYSTOLIC BLOOD PRESSURE: 118 MMHG

## 2024-02-08 DIAGNOSIS — O23.40 GROUP B STREPTOCOCCUS URINARY TRACT INFECTION AFFECTING PREGNANCY, ANTEPARTUM: ICD-10-CM

## 2024-02-08 DIAGNOSIS — O34.219 PATIENT DESIRES VAGINAL BIRTH AFTER CESAREAN SECTION (VBAC): Primary | ICD-10-CM

## 2024-02-08 DIAGNOSIS — B95.1 GROUP B STREPTOCOCCUS URINARY TRACT INFECTION AFFECTING PREGNANCY, ANTEPARTUM: ICD-10-CM

## 2024-02-08 PROCEDURE — 99207 PR PRENATAL VISIT: CPT | Performed by: OBSTETRICS & GYNECOLOGY

## 2024-02-08 NOTE — PROGRESS NOTES
Presents for routine  appointment.  No complaints.    No leaking fluid, no contractions, no vaginal bleeding   ROS:   and GI negative.     Please see Prenatal Vitals and Notes Flowsheet for objective data.  /81   Wt 84.6 kg (186 lb 9.6 oz)   LMP 2023   BMI 29.23 kg/m       A/P:  33 year old  at 36w6d       ICD-10-CM    1. Patient desires vaginal birth after  section ()  O34.219       2. Group B Streptococcus urinary tract infection affecting pregnancy, antepartum  O23.40     B95.1           Group B Strep bacteruria in 1st trimester - treat in labor.   Discussed labor and what to expect. Discussed when to go to the birth center.  CS scheduled for the  in the event she does not go into labor  CE: FT, 50%, -3, posterior, soft   Follow up in 1 week.      Shona Lu MD

## 2024-02-08 NOTE — PATIENT INSTRUCTIONS
If you have labs or imaging done, the results will automatically release in [facility] without an interpretation.  Your health care professional will review those results and send an interpretation with recommendations as soon as possible, but this may be 1-3 business days.    If you have any questions regarding your visit, please contact your care team.     [employer]: [phone]  [facility] CLINIC HOURS TELEPHONE NUMBER       MD Alie Rey - Certified Medical Assistant     Maty- LEAD RN  Malinda-NARENDRA Gill-NARENDRA Rodriguez-  Maria Elena-     Monday- [place]  8:00 a.m - 5:00 p.m    Tuesday- Surgery        Thursday- [place]  8:00 a.m - 5:00 p.m.    Friday- [place]  7:30 a.m - 4:00 p.m. [facility]  [address]  [place], [place] [address] [phone] Fax  [phone] Phone  Imaging Scheduling [phone]    [facility] Labor and Delivery  [address] [place] [address] Dr.  [place], [place] [address] [phone]    [facility]  [address] [place], [place] [address] [phone] Phone  [phone] Fax  Imaging Scheduling [phone]     Urgent Care locations:  [facility] Monday-Friday  10 am - 8 pm  Saturday and Sunday   9 am - 5 pm  Monday-Friday   10 am - 8 pm  Saturday and Sunday   9 am - 5 pm   [phone]     **Surgeries** Our Surgery Schedulers will contact you to schedule. If you do not receive a call within 3 business days, please call [phone].    If you need a medication refill, please contact your pharmacy. Please allow 3 business days for your refill to be completed.    As always, thank you for trusting us with your healthcare needs!

## 2024-02-13 NOTE — PATIENT INSTRUCTIONS
If you have labs or imaging done, the results will automatically release in [employer] without an interpretation.  Your health care professional will review those results and send an interpretation with recommendations as soon as possible, but this may be 1-3 business days.    If you have any questions regarding your visit, please contact your care team.     [employer]: [phone]  [facility] CLINIC HOURS TELEPHONE NUMBER       MD Alie Rey - Certified Medical Assistant     Maty- LEAD RN  Malinda-NARENDRA Gill-NARENDRA Rodriguez-  Maria Elena-     Monday- [place]  8:00 a.m - 5:00 p.m    Tuesday- Surgery        Thursday- [place]  8:00 a.m - 5:00 p.m.    Friday- [place]  7:30 a.m - 4:00 p.m. [facility]  [address]  [place], [place] [address] [phone] Fax  [phone] Phone  Imaging Scheduling [phone]    [facility] Labor and Delivery  [address] [place] [address] Dr.  [place], [place] [address] [phone]    [facility]  [address] [place], [place] [address] [phone] Phone  [phone] Fax  Imaging Scheduling [phone]     Urgent Care locations:  [facility] Monday-Friday  10 am - 8 pm  Saturday and Sunday   9 am - 5 pm  Monday-Friday   10 am - 8 pm  Saturday and Sunday   9 am - 5 pm   [phone]     **Surgeries** Our Surgery Schedulers will contact you to schedule. If you do not receive a call within 3 business days, please call [phone].    If you need a medication refill, please contact your pharmacy. Please allow 3 business days for your refill to be completed.    As always, thank you for trusting us with your healthcare needs!

## 2024-02-15 ENCOUNTER — PRENATAL OFFICE VISIT (OUTPATIENT)
Dept: OBGYN | Facility: OTHER | Age: 34
End: 2024-02-15
Payer: COMMERCIAL

## 2024-02-15 VITALS — DIASTOLIC BLOOD PRESSURE: 83 MMHG | SYSTOLIC BLOOD PRESSURE: 135 MMHG | WEIGHT: 188.4 LBS | BODY MASS INDEX: 29.51 KG/M2

## 2024-02-15 DIAGNOSIS — O34.219 PATIENT DESIRES VAGINAL BIRTH AFTER CESAREAN SECTION (VBAC): Primary | ICD-10-CM

## 2024-02-15 PROCEDURE — 99207 PR PRENATAL VISIT: CPT | Performed by: OBSTETRICS & GYNECOLOGY

## 2024-02-15 NOTE — PROGRESS NOTES
Presents for routine  appointment.    No leaking fluid , no contractions aside from BH, no vaginal bleeding    ROS:   and GI negative.     Please see Prenatal Vitals and Notes Flowsheet for objective data.  /83   Wt 85.5 kg (188 lb 6.4 oz)   LMP 2023   BMI 29.51 kg/m       A/P:  33 year old  at 37w6d       ICD-10-CM    1. Patient desires vaginal birth after  section ()  O34.219           Labor precautions given   Group B Strep bacteruria in 1st trimester - treat in labor.    CS scheduled for the  in the event she does not go into labor  CE: 0.5, 50%, -3, posterior, soft   Follow up in 1 week.      Shona Lu MD

## 2024-02-22 ENCOUNTER — PRENATAL OFFICE VISIT (OUTPATIENT)
Dept: OBGYN | Facility: OTHER | Age: 34
End: 2024-02-22
Payer: COMMERCIAL

## 2024-02-22 VITALS — WEIGHT: 188 LBS | SYSTOLIC BLOOD PRESSURE: 120 MMHG | DIASTOLIC BLOOD PRESSURE: 80 MMHG | BODY MASS INDEX: 29.44 KG/M2

## 2024-02-22 DIAGNOSIS — B95.1 GROUP B STREPTOCOCCUS URINARY TRACT INFECTION AFFECTING PREGNANCY, ANTEPARTUM: ICD-10-CM

## 2024-02-22 DIAGNOSIS — O34.219 PATIENT DESIRES VAGINAL BIRTH AFTER CESAREAN SECTION (VBAC): Primary | ICD-10-CM

## 2024-02-22 DIAGNOSIS — O23.40 GROUP B STREPTOCOCCUS URINARY TRACT INFECTION AFFECTING PREGNANCY, ANTEPARTUM: ICD-10-CM

## 2024-02-22 DIAGNOSIS — O34.219 HISTORY OF CESAREAN DELIVERY, CURRENTLY PREGNANT: ICD-10-CM

## 2024-02-22 PROCEDURE — 99207 PR PRENATAL VISIT: CPT | Performed by: OBSTETRICS & GYNECOLOGY

## 2024-02-22 NOTE — PATIENT INSTRUCTIONS
If you have labs or imaging done, the results will automatically release in [facility] without an interpretation.  Your health care professional will review those results and send an interpretation with recommendations as soon as possible, but this may be 1-3 business days.    If you have any questions regarding your visit, please contact your care team.     [employer] Access Services: [phone]  [facility] CLINIC HOURS TELEPHONE NUMBER       MD Alie Rey - Certified Medical Assistant     Maty- LEAD RN  Malinda-NARENDRA Gill-NARENDRA Rodriguez-  Maria Elena-     Monday- [place]  8:00 a.m - 5:00 p.m    Tuesday- Surgery        Thursday- [place]  8:00 a.m - 5:00 p.m.    Friday- [place]  7:30 a.m - 4:00 p.m. [facility]  [address]  [place], [place] [address] [phone] Fax  [phone] Phone  Imaging Scheduling [phone]    [facility] Labor and Delivery  [address] [place] [address] Dr.  [place], [place] [address] [phone]    [facility]  [address] [place], [place] [id number] [phone] Phone  [phone] Fax  Imaging Scheduling [phone]     Urgent Care locations:  [facility] Monday-Friday  10 am - 8 pm  Saturday and Sunday   9 am - 5 pm  Monday-Friday   10 am - 8 pm  Saturday and Sunday   9 am - 5 pm   [phone]     **Surgeries** Our Surgery Schedulers will contact you to schedule. If you do not receive a call within 3 business days, please call [phone].    If you need a medication refill, please contact your pharmacy. Please allow 3 business days for your refill to be completed.    As always, thank you for trusting us with your healthcare needs!

## 2024-02-22 NOTE — PROGRESS NOTES
2024     NAME:  Diya Lombardo  PCP:  Willard Arriola  MRN:  [id number]    PREOPERATIVE EXAM    Impression / Plan     Assessment:   33 year old  at 38w6d with history of , desires   Body mass index is 29.44 kg/m .   Patient is at intermediate risk for the proposed surgery    Plan:    Repeat  section in the event she does not go into labor.  Group B Strep bacteruria in 1st trimester - treat in labor.     We discussed the plans for  section. She is aware of the risks, benefits, and alternatives to  delivery.  Risks include but are not limited to bleeding, infections which may require antibiotic therapy, injury maternal organs (to include but not limited to blood vessels, nerves, muscle, skin, bladder, ureters, bowel, uterus, tubes, ovaries), as well as risk of injury to the fetus.  If bleeding is excessive it might require transfusion or rarely a hysterectomy.  She acknowledged understanding of the risks    She is optimized for surgery    HPI     Diya Lombardo is a  33 year old who is seen for routine prenatal visit.    Patient has no concerns today.  She denies contractions, leaking of amniotic fluid, and vaginal bleeding.      1. No - Have you ever had a heart attack or stroke?  2. No - Have you ever had surgery on your heart or blood vessels, such as a stent, coronary (heart) bypass, or surgery on an artery in the head, neck, heart, or legs?  3. No - Do you have chest pain when you are physically active?  4. No - Do you have a history of heart failure?  5. No - Do you currently have a cold, bronchitis, or symptoms of other respiratory (head and chest) infections?  6. No - Do you have a cough, shortness of breath, or wheezing?  7. No - Do you or anyone in your family have a history of blood clots?  8. No - Do you or anyone in your family have a serious bleeding problem, such as long-lasting bleeding after surgeries or cuts?  9. No - Have you ever had anemia or  been told to take iron pills?  10. No - Have you had any abnormal blood loss such as black, tarry or bloody stools, or abnormal vaginal bleeding?  11. No - Have you ever had a blood transfusion?  12. Yes - Are you willing to have a blood transfusion if it is medically needed before, during, or after your surgery?  13. No - Have you or anyone in your family ever had problems with anesthesia (sedation for surgery)?  14. No - Do you have sleep apnea, excessive snoring, or daytime drowsiness?   15. No - Do you have any artifical heart valves or other implanted medical devices, such as a pacemaker, defibrillator, or continuous glucose monitor?  16. No - Do you have any artifical joints?  17. No - Are you allergic to latex?     Problem List     Patient Active Problem List   Diagnosis    Intermittent asthma    Migraine headache without aura    History of  delivery, currently pregnant    Rh negative state in antepartum period    GBS (group B streptococcus) UTI complicating pregnancy    Influenza vaccination declined    Patient desires vaginal birth after  section ()        Medications     Current Outpatient Medications   Medication    albuterol (PROAIR HFA/PROVENTIL HFA/VENTOLIN HFA) 108 (90 Base) MCG/ACT inhaler    cetirizine (ZYRTEC) 10 MG tablet    fluticasone (FLONASE) 50 MCG/ACT nasal spray    Prenatal Vit-Fe Fumarate-FA (PRENATAL MULTIVITAMIN W/IRON) 27-0.8 MG tablet    levocetirizine (XYZAL) 5 MG tablet    Misc. Devices (BREAST PUMP) MISC     No current facility-administered medications for this visit.          Allergies     Allergies   Allergen Reactions    Beeswax Swelling    Benzoin        Medical / Surgical History     Past Medical History:   Diagnosis Date    History of hypothyroidism 2014    Migraines     Uncomplicated asthma     exercised induced       Past Surgical History:   Procedure Laterality Date    bunionectomy, left  2008    outside of [place]     SECTION       EXCISE EXOSTOSIS FOOT  02/04/2014    Procedure: EXCISE EXOSTOSIS FOOT;  Exostectomy left first metatarsal;  Surgeon: Flako Colin DPM;  Location: [facility] OR    wisdom teeth extraction Bilateral 2008       Social History     Social History     Socioeconomic History    Marital status:      Spouse name: Ismael    Number of children: 1    Years of education: Not on file    Highest education level: Not on file   Occupational History    Occupation: insurance rep     Comment: [employer]   Tobacco Use    Smoking status: Never    Smokeless tobacco: Never   Vaping Use    Vaping Use: Never used   Substance and Sexual Activity    Alcohol use: Not Currently    Drug use: No    Sexual activity: Not Currently     Partners: Male     Birth control/protection: None   Other Topics Concern    Parent/sibling w/ CABG, MI or angioplasty before 65F 55M? Not Asked   Social History Narrative    Not on file     Social Determinants of Health     Financial Resource Strain: Not on file   Food Insecurity: Not on file   Transportation Needs: Not on file   Physical Activity: Not on file   Stress: Not on file   Social Connections: Not on file   Interpersonal Safety: Not on file   Housing Stability: Not on file       Family History     Family History   Problem Relation Age of Onset    Cerebrovascular Disease Mother     Heart Disease Mother         PFO repair    Thrombosis Mother 36        stroke    Asthma Mother     Cancer Maternal Grandmother         kidney    Pancreatic Cancer Maternal Grandmother     Lung Cancer Maternal Grandmother     Diabetes Maternal Grandmother         Type 2, requires insulin    Arthritis Maternal Grandmother     Diabetes Maternal Uncle     No Known Problems Father     Depression Brother     Anxiety Disorder Brother        ROS     Review of systems was asked and the pertinent positives and negatives are listed in the HPI. All other organ systems can be considered negative.     Physical Exam   Vitals: /80   Wt  85.3 kg (188 lb)   LMP 05/26/2023   BMI 29.44 kg/m      General: Well developed, well nourished, pleasant. No acute distress.    Neck: Trachea midline. Supple, no palpable masses.  No thyromegaly.  CV: Regular rhythm, normal rate. No murmurs auscultated  Resp: Nonlabored. Clear to auscultation bilaterally, no wheezes.   Abdomen: Soft, non tender, gravid  Skin: No rashes, lesions, or subcutaneous nodules.   : 0.5, 50%, -3, posterior, soft   Neuro:  Alert and oriented times 3.  Appropriate.    See obstetrical flowsheet for additional findings.     Labs/Imaging       Labs were reviewed in Epic    Hemoglobin   Date Value Ref Range Status   11/10/2023 11.3 (L) 11.7 - 15.7 g/dL Final   04/29/2021 14.3 11.7 - 15.7 g/dL Final   ]       Imaging was reviewed in Epic.   Anterior placenta.        Shona Lu MD

## 2024-03-05 ENCOUNTER — MYC MEDICAL ADVICE (OUTPATIENT)
Dept: OBGYN | Facility: OTHER | Age: 34
End: 2024-03-05
Payer: COMMERCIAL

## 2024-04-11 ASSESSMENT — PATIENT HEALTH QUESTIONNAIRE - PHQ9
SUM OF ALL RESPONSES TO PHQ QUESTIONS 1-9: 0
SUM OF ALL RESPONSES TO PHQ QUESTIONS 1-9: 0
10. IF YOU CHECKED OFF ANY PROBLEMS, HOW DIFFICULT HAVE THESE PROBLEMS MADE IT FOR YOU TO DO YOUR WORK, TAKE CARE OF THINGS AT HOME, OR GET ALONG WITH OTHER PEOPLE: NOT DIFFICULT AT ALL

## 2024-04-12 ENCOUNTER — MEDICAL CORRESPONDENCE (OUTPATIENT)
Dept: HEALTH INFORMATION MANAGEMENT | Facility: CLINIC | Age: 34
End: 2024-04-12

## 2024-04-12 ENCOUNTER — PRENATAL OFFICE VISIT (OUTPATIENT)
Dept: OBGYN | Facility: CLINIC | Age: 34
End: 2024-04-12
Payer: COMMERCIAL

## 2024-04-12 VITALS — BODY MASS INDEX: 26.42 KG/M2 | DIASTOLIC BLOOD PRESSURE: 77 MMHG | SYSTOLIC BLOOD PRESSURE: 121 MMHG | WEIGHT: 168.7 LBS

## 2024-04-12 PROBLEM — O34.219 HISTORY OF CESAREAN DELIVERY, CURRENTLY PREGNANT: Status: RESOLVED | Noted: 2023-08-14 | Resolved: 2024-04-12

## 2024-04-12 PROBLEM — B95.1 GBS (GROUP B STREPTOCOCCUS) UTI COMPLICATING PREGNANCY: Status: RESOLVED | Noted: 2023-09-12 | Resolved: 2024-04-12

## 2024-04-12 PROBLEM — O26.899 RH NEGATIVE STATE IN ANTEPARTUM PERIOD: Status: RESOLVED | Noted: 2023-09-12 | Resolved: 2024-04-12

## 2024-04-12 PROBLEM — Z67.91 RH NEGATIVE STATE IN ANTEPARTUM PERIOD: Status: RESOLVED | Noted: 2023-09-12 | Resolved: 2024-04-12

## 2024-04-12 PROBLEM — O23.40 GBS (GROUP B STREPTOCOCCUS) UTI COMPLICATING PREGNANCY: Status: RESOLVED | Noted: 2023-09-12 | Resolved: 2024-04-12

## 2024-04-12 PROBLEM — O34.219 PATIENT DESIRES VAGINAL BIRTH AFTER CESAREAN SECTION (VBAC): Status: RESOLVED | Noted: 2023-11-10 | Resolved: 2024-04-12

## 2024-04-12 PROCEDURE — 99207 PR POST PARTUM EXAM: CPT | Performed by: OBSTETRICS & GYNECOLOGY

## 2024-04-12 NOTE — PATIENT INSTRUCTIONS
If you have labs or imaging done, the results will automatically release in [employer] [facility] without an interpretation.  Your health care professional will review those results and send an interpretation with recommendations as soon as possible, but this may be 1-3 business days.    If you have any questions regarding your visit, please contact your care team.     [employer]: [phone]  [facility] CLINIC HOURS TELEPHONE NUMBER       MD Alie Rey - Certified Medical Assistant     Maty- LEAD RN  Malinda-NARENDRA Gill-NARENDRA Rodriguez-  Maria Elena-     Monday- [place]  8:00 a.m - 5:00 p.m    Tuesday- Surgery        Thursday- [place]  8:00 a.m - 5:00 p.m.    Friday- [place]  7:30 a.m - 4:00 p.m. [facility]  [address]  [place], [place] [address] [phone] Fax  [phone] Phone  Imaging Scheduling [phone]    [facility] Labor and Delivery  [address] [place] [address] Dr.  [place], [place] [address] [phone]    [facility]  [address] [place], [place] [address] [phone] Phone  [phone] Fax  Imaging Scheduling [phone]     Urgent Care locations:  [facility] Monday-Friday  10 am - 8 pm  Saturday and Sunday   9 am - 5 pm  Monday-Friday   10 am - 8 pm  Saturday and Sunday   9 am - 5 pm   [phone]     **Surgeries** Our Surgery Schedulers will contact you to schedule. If you do not receive a call within 3 business days, please call [phone].    If you need a medication refill, please contact your pharmacy. Please allow 3 business days for your refill to be completed.    As always, thank you for trusting us with your healthcare needs!

## 2024-04-12 NOTE — PROGRESS NOTES
SUBJECTIVE:  33 year old  here for a 6-week postpartum checkup.  She had a repeat CS with me 24       OB History    Para Term  AB Living   2 2 2 0 0 2   SAB IAB Ectopic Multiple Live Births   0 0 0 0 2      # Outcome Date GA Lbr Con/2nd Weight Sex Type Anes PTL Lv   2 Term 25 39w5d  3.81 kg (8 lb 6.4 oz) F CS-Unspec Spinal N NATHALIA      Name: Jorge Lombardo      Apgar1: 9  Apgar5: 10   1 Term 21 41w0d  3.28 kg (7 lb 3.7 oz) F CS-Unspec EPI, IV N NATHALIA      Complications: Fetal Intolerance      Name: MARQUIS,BABY GIRL      Apgar1: 8  Apgar5: 9       Complications: None    Since delivery, she has been breast and bottle feeding.  She has no bleeding, abnormal discharge or pain.   Patient screened for postpartum depression. Has good support system in place.     Lab Results   Component Value Date    PAP NIL 2020         EXAM:  /77   Wt 76.5 kg (168 lb 11.2 oz)   LMP 2023   Breastfeeding Yes   BMI 26.42 kg/m       General: alert, healthy appearing woman  HEENT: normal  Lungs: Nonlabored.     Abdomen: soft, nontender, no palpable masses.  Incision well healed  Pelvic exam: deferred    ASSESSMENT:  Normal postpartum exam    PLAN:  1. Contraceptive options reviewed; patient wishes to utilize: Nothing at this point.  She will reach out if she changes her mind  2. Continue with annual health maintenance exams.     Shona Lu MD     Answers submitted by the patient for this visit:  Patient Health Questionnaire (Submitted on 2024)  If you checked off any problems, how difficult have these problems made it for you to do your work, take care of things at home, or get along with other people?: Not difficult at all  PHQ9 TOTAL SCORE: 0

## 2024-04-26 ENCOUNTER — MEDICAL CORRESPONDENCE (OUTPATIENT)
Dept: HEALTH INFORMATION MANAGEMENT | Facility: CLINIC | Age: 34
End: 2024-04-26
Payer: COMMERCIAL

## 2024-06-28 ENCOUNTER — MEDICAL CORRESPONDENCE (OUTPATIENT)
Dept: HEALTH INFORMATION MANAGEMENT | Facility: CLINIC | Age: 34
End: 2024-06-28
Payer: COMMERCIAL

## 2024-07-06 ENCOUNTER — HEALTH MAINTENANCE LETTER (OUTPATIENT)
Age: 34
End: 2024-07-06

## 2024-08-30 ENCOUNTER — MEDICAL CORRESPONDENCE (OUTPATIENT)
Dept: HEALTH INFORMATION MANAGEMENT | Facility: CLINIC | Age: 34
End: 2024-08-30
Payer: COMMERCIAL

## 2025-07-13 ENCOUNTER — HEALTH MAINTENANCE LETTER (OUTPATIENT)
Age: 35
End: 2025-07-13